# Patient Record
Sex: FEMALE | Race: WHITE | NOT HISPANIC OR LATINO | Employment: UNEMPLOYED | URBAN - METROPOLITAN AREA
[De-identification: names, ages, dates, MRNs, and addresses within clinical notes are randomized per-mention and may not be internally consistent; named-entity substitution may affect disease eponyms.]

---

## 2017-04-02 ENCOUNTER — HOSPITAL ENCOUNTER (OUTPATIENT)
Facility: MEDICAL CENTER | Age: 22
End: 2017-04-02
Attending: FAMILY MEDICINE
Payer: COMMERCIAL

## 2017-04-02 ENCOUNTER — OFFICE VISIT (OUTPATIENT)
Dept: URGENT CARE | Facility: PHYSICIAN GROUP | Age: 22
End: 2017-04-02
Payer: COMMERCIAL

## 2017-04-02 VITALS
HEART RATE: 108 BPM | DIASTOLIC BLOOD PRESSURE: 82 MMHG | OXYGEN SATURATION: 98 % | RESPIRATION RATE: 18 BRPM | TEMPERATURE: 99.7 F | SYSTOLIC BLOOD PRESSURE: 110 MMHG

## 2017-04-02 DIAGNOSIS — N76.0 BV (BACTERIAL VAGINOSIS): ICD-10-CM

## 2017-04-02 DIAGNOSIS — B96.89 BV (BACTERIAL VAGINOSIS): ICD-10-CM

## 2017-04-02 DIAGNOSIS — N89.8 VAGINAL DISCHARGE: ICD-10-CM

## 2017-04-02 DIAGNOSIS — A54.9 GONORRHEA: ICD-10-CM

## 2017-04-02 PROCEDURE — 87480 CANDIDA DNA DIR PROBE: CPT

## 2017-04-02 PROCEDURE — 87660 TRICHOMONAS VAGIN DIR PROBE: CPT

## 2017-04-02 PROCEDURE — 87491 CHLMYD TRACH DNA AMP PROBE: CPT

## 2017-04-02 PROCEDURE — 87510 GARDNER VAG DNA DIR PROBE: CPT

## 2017-04-02 PROCEDURE — 99214 OFFICE O/P EST MOD 30 MIN: CPT | Performed by: FAMILY MEDICINE

## 2017-04-02 PROCEDURE — 87591 N.GONORRHOEAE DNA AMP PROB: CPT

## 2017-04-02 ASSESSMENT — ENCOUNTER SYMPTOMS
EYE DISCHARGE: 0
SENSORY CHANGE: 0
FOCAL WEAKNESS: 0
WEIGHT LOSS: 0

## 2017-04-02 NOTE — MR AVS SNAPSHOT
Lexie Victor Manuel   2017 4:45 PM   Office Visit   MRN: [id number]    Department:  [facility]   Dept Phone:  [phone]    Description:  Female : 1995   Provider:  Darion Gilliland M.D.           Reason for Visit     Vaginal Discharge Vag discharge/ foul odor x1mos      Allergies as of 2017     Allergen Noted Reactions    Amoxicillin 2010       Causes Henoch Purpura. Unknown allergic reaction    Ibuprofen 2010       Causes Henoch Purpura. Takes Advil without problems.      You were diagnosed with     Vaginal discharge   [2015]         Vital Signs     Blood Pressure Pulse Temperature Respirations Oxygen Saturation Smoking Status    110/82 mmHg 108 37.6 °C (99.7 °F) 18 98% Former Smoker      Basic Information     Date Of Birth Sex Race Ethnicity Preferred Language    1995 Female White Non- English      Problem List              ICD-10-CM Priority Class Noted - Resolved    Teen pregnancy [id number]   2010 - Present    Twin pregnancy - Mono/Diamniotic    6/15/2010 - Present    Elevated AFP - Referred to PANN R77.2   2010 - Present    Twin pregnancy with fetal loss and retention of one fetus, antepartum O30.009, O31.10X0   2010 - Present    Postpartum care and examination    10/15/2010 - Present    Labor and delivery, indication for care O75.9   4/15/2013 - Present    Pelvic pain    2014 - Present      Health Maintenance        Date Due Completion Dates    IMM HEP B VACCINE (1 of 3 - Primary Series) 1995 ---    IMM HEP A VACCINE (1 of 2 - Standard Series) 1/10/1995 ---    IMM HPV VACCINE (1 of 3 - Female 3 Dose Series) 1/10/2005 ---    IMM VARICELLA (CHICKENPOX) VACCINE (1 of 2 - 2 Dose Adolescent Series) 1/10/2007 ---    PAP SMEAR 1/10/2015 2010    IMM DTaP/Tdap/Td Vaccine (2 - Td) 2023            Current Immunizations     MMR Vaccine 2013  6:00 PM    Tdap Vaccine 2013  4:15 PM      Below and/or attached are the  medications your provider expects you to take. Review all of your home medications and newly ordered medications with your provider and/or pharmacist. Follow medication instructions as directed by your provider and/or pharmacist. Please keep your medication list with you and share with your provider. Update the information when medications are discontinued, doses are changed, or new medications (including over-the-counter products) are added; and carry medication information at all times in the event of emergency situations     Allergies:  AMOXICILLIN - (reactions not documented)     IBUPROFEN - (reactions not documented)               Medications  Valid as of: April 02, 2017 -  5:43 PM    Generic Name Brand Name Tablet Size Instructions for use    .                 Medicines prescribed today were sent to:     [facility] #[id number] - [place], [place] - [facility] AT [address] [address]    Phone: [phone] Fax: [phone]    Open 24 Hours?: No      Medication refill instructions:       If your prescription bottle indicates you have medication refills left, it is not necessary to call your provider’s office. Please contact your pharmacy and they will refill your medication.    If your prescription bottle indicates you do not have any refills left, you may request refills at any time through one of the following ways: The online Orad system (except Urgent Care), by calling your provider’s office, or by asking your pharmacy to contact your provider’s office with a refill request. Medication refills are processed only during regular business hours and may not be available until the next business day. Your provider may request additional information or to have a follow-up visit with you prior to refilling your medication.   *Please Note: Medication refills are assigned a new Rx number when refilled electronically. Your pharmacy may indicate that no refills were authorized even though a new  prescription for the same medication is available at the pharmacy. Please request the medicine by name with the pharmacy before contacting your provider for a refill.        Your To Do List     Future Labs/Procedures Complete By Expires    CHLAMYDIA/GC PCR URINE OR SWAB  As directed 4/2/2018    VAGINAL PATHOGENS DNA PANEL  As directed 4/2/2018         CircuitHub Access Code: [email or web address]  Expires: 5/2/2017  5:43 PM    CircuitHub  A secure, online tool to manage your health information     [employer]’s CircuitHub® is a secure, online tool that connects you to your personalized health information from the privacy of your home -- day or night - making it very easy for you to manage your healthcare. Once the activation process is completed, you can even access your medical information using the CircuitHub calvin, which is available for free in the Apple Calvin store or Google Play store.     CircuitHub provides the following levels of access (as shown below):   My Chart Features   [facility] Primary Care Doctor [facility]  Specialists [facility]  Urgent  Care Non-[facility]  Primary Care  Doctor   Email your healthcare team securely and privately 24/7 X X X    Manage appointments: schedule your next appointment; view details of past/upcoming appointments X      Request prescription refills. X      View recent personal medical records, including lab and immunizations X X X X   View health record, including health history, allergies, medications X X X X   Read reports about your outpatient visits, procedures, consult and ER notes X X X X   See your discharge summary, which is a recap of your hospital and/or ER visit that includes your diagnosis, lab results, and care plan. X X       How to register for CircuitHub:  1. Go to  [email or web address].  2. Click on the Sign Up Now box, which takes you to the New Member Sign Up page. You will need to provide the following information:  a. Enter your CircuitHub Access Code exactly as it appears at the top of  this page. (You will not need to use this code after you’ve completed the sign-up process. If you do not sign up before the expiration date, you must request a new code.)   b. Enter your date of birth.   c. Enter your home email address.   d. Click Submit, and follow the next screen’s instructions.  3. Create a Bloxr ID. This will be your Bloxr login ID and cannot be changed, so think of one that is secure and easy to remember.  4. Create a Bloxr password. You can change your password at any time.  5. Enter your Password Reset Question and Answer. This can be used at a later time if you forget your password.   6. Enter your e-mail address. This allows you to receive e-mail notifications when new information is available in Bloxr.  7. Click Sign Up. You can now view your health information.    For assistance activating your Bloxr account, call [phone]

## 2017-04-03 DIAGNOSIS — N89.8 VAGINAL DISCHARGE: ICD-10-CM

## 2017-04-03 LAB
CANDIDA DNA VAG QL PROBE+SIG AMP: NEGATIVE
G VAGINALIS DNA VAG QL PROBE+SIG AMP: POSITIVE
T VAGINALIS DNA VAG QL PROBE+SIG AMP: NEGATIVE

## 2017-04-03 NOTE — PROGRESS NOTES
Subjective:      Lexie Rush is a 22 y.o. female who presents with Vaginal Discharge            Other  This is a new problem. Episode onset: 1 month vaginal discharge with change in odor. Concerned and STI and has had unproteded sex. No fever. No dysuria. No urinary urgency or frequency. Denies possible pregnancy.  Pertinent negatives include no rash.   No other aggravating or alleviating factors.      Review of Systems   Constitutional: Negative for weight loss and malaise/fatigue.   Eyes: Negative for discharge.   Skin: Negative for itching and rash.   Neurological: Negative for sensory change and focal weakness.     .  Medications, Allergies, and current problem list reviewed today in Epic       Objective:     /82 mmHg  Pulse 108  Temp(Src) 37.6 °C (99.7 °F)  Resp 18  SpO2 98%     Physical Exam   Constitutional: She appears well-developed and well-nourished. No distress.   Eyes: Conjunctivae are normal.   Genitourinary:   Yellow discharge, vaginal mucosa mildly inflamed without lesions. No CMT   Neurological:   Speech is clear. Patient is appropriate and cooperative.     Skin: Skin is warm and dry. No rash noted.               Assessment/Plan:     Cell: [phone] ok to leave message    1. Vaginal discharge  CHLAMYDIA/GC PCR URINE OR SWAB    VAGINAL PATHOGENS DNA PANEL   2. BV (bacterial vaginosis)  metronidazole (FLAGYL) 500 MG Tab   3. Gonorrhea  azithromycin (ZITHROMAX) 500 MG tablet     Differential diagnosis, natural history, supportive care, and indications for immediate follow-up discussed at length.

## 2017-04-05 LAB
C TRACH DNA SPEC QL NAA+PROBE: NEGATIVE
N GONORRHOEA DNA SPEC QL NAA+PROBE: NEGATIVE
SPECIMEN SOURCE: NORMAL

## 2017-04-05 RX ORDER — METRONIDAZOLE 500 MG/1
500 TABLET ORAL EVERY 12 HOURS
Qty: 14 TAB | Refills: 0 | Status: SHIPPED | OUTPATIENT
Start: 2017-04-05 | End: 2017-04-12

## 2017-04-06 RX ORDER — AZITHROMYCIN 500 MG/1
2000 TABLET, FILM COATED ORAL ONCE
Qty: 4 TAB | Refills: 0 | Status: SHIPPED | OUTPATIENT
Start: 2017-04-06 | End: 2017-04-06

## 2017-04-07 NOTE — PROGRESS NOTES
Gonorrhea +    I have left multiple message with number in Epic. Rx azithromycin sent to her pharmacy.

## 2017-04-08 RX ORDER — CEFTRIAXONE SODIUM 250 MG/1
250 INJECTION, POWDER, FOR SOLUTION INTRAMUSCULAR; INTRAVENOUS ONCE
OUTPATIENT
Start: 2017-04-08 | End: 2017-04-09

## 2017-09-27 ENCOUNTER — OFFICE VISIT (OUTPATIENT)
Dept: URGENT CARE | Facility: PHYSICIAN GROUP | Age: 22
End: 2017-09-27
Payer: COMMERCIAL

## 2017-09-27 VITALS
DIASTOLIC BLOOD PRESSURE: 54 MMHG | HEART RATE: 104 BPM | HEIGHT: 68 IN | BODY MASS INDEX: 21.98 KG/M2 | TEMPERATURE: 98 F | OXYGEN SATURATION: 96 % | WEIGHT: 145 LBS | SYSTOLIC BLOOD PRESSURE: 100 MMHG

## 2017-09-27 DIAGNOSIS — N89.8 VAGINAL DISCHARGE: ICD-10-CM

## 2017-09-27 PROCEDURE — 99213 OFFICE O/P EST LOW 20 MIN: CPT | Performed by: PHYSICIAN ASSISTANT

## 2017-09-27 ASSESSMENT — ENCOUNTER SYMPTOMS
ABDOMINAL PAIN: 0
FLANK PAIN: 0
VOMITING: 0
BLOOD IN STOOL: 0
CHILLS: 0
DIARRHEA: 0
FEVER: 0
NAUSEA: 0
CONSTIPATION: 0

## 2017-09-27 NOTE — PROGRESS NOTES
"Subjective:      Lexie Rush is a 22 y.o. female who presents with Exposure to STD (Dx and Tx in April for STD, symptoms have returned )            Vaginal discharge x weeks.  Evaluated in April with similar symptoms, treated for BV, patient is requesting the same medication again.   At that time she thought she her Gonorrhea test was positive.  She denies unprotected intercourse since that time.  States the symptoms had improved but her vaginal discharge recently changed and is similar to the previous episode with foul odor and slight change in color.        Exposure to STD   Pertinent negatives include no abdominal pain, chills, fever, nausea or vomiting.       Review of Systems   Constitutional: Negative for chills, fever and malaise/fatigue.   Gastrointestinal: Negative for abdominal pain, blood in stool, constipation, diarrhea, melena, nausea and vomiting.   Genitourinary: Negative.  Negative for dysuria, flank pain, frequency, hematuria and urgency.          Objective:     /54   Pulse (!) 104   Temp 36.7 °C (98 °F)   Ht 1.727 m (5' 8\")   Wt 65.8 kg (145 lb)   SpO2 96%   BMI 22.05 kg/m²      Physical Exam   Constitutional: She is oriented to person, place, and time. She appears well-developed and well-nourished.   Cardiovascular: Normal rate, regular rhythm, normal heart sounds and intact distal pulses.    Pulmonary/Chest: Effort normal and breath sounds normal.   Abdominal: Soft. Bowel sounds are normal. She exhibits no distension and no mass. There is no tenderness. There is no rebound and no guarding.   Neurological: She is alert and oriented to person, place, and time.   Skin: Skin is warm and dry. No rash noted. No pallor.   Psychiatric:   Tearful, slow to respond to questions.     Nursing note and vitals reviewed.              Assessment/Plan:     1. Vaginal discharge  While MA was setting up for pelvic exam patient became very emotional and requested to have the tests completed by her " OB/GYN.    I returned to the room and let the patient know I was not able to treat her without an exam/testing as I cannot determine the proper medication otherwise.  She would prefer to see her OB/GYN.  We discussed that she is always welcome to return if she changes her mind and would like to proceed with further evaluation.  Follow-up if symptoms change, get worse or new symptoms develop.

## 2018-03-25 ENCOUNTER — HOSPITAL ENCOUNTER (OUTPATIENT)
Facility: MEDICAL CENTER | Age: 23
End: 2018-03-25
Attending: PHYSICIAN ASSISTANT
Payer: COMMERCIAL

## 2018-03-25 ENCOUNTER — OFFICE VISIT (OUTPATIENT)
Dept: URGENT CARE | Facility: PHYSICIAN GROUP | Age: 23
End: 2018-03-25
Payer: COMMERCIAL

## 2018-03-25 VITALS
SYSTOLIC BLOOD PRESSURE: 130 MMHG | OXYGEN SATURATION: 94 % | DIASTOLIC BLOOD PRESSURE: 90 MMHG | HEART RATE: 120 BPM | TEMPERATURE: 98.1 F | HEIGHT: 67 IN | BODY MASS INDEX: 20.4 KG/M2 | WEIGHT: 130 LBS

## 2018-03-25 DIAGNOSIS — N89.8 VAGINAL DISCHARGE: ICD-10-CM

## 2018-03-25 DIAGNOSIS — Z20.2 POSSIBLE EXPOSURE TO STD: ICD-10-CM

## 2018-03-25 DIAGNOSIS — R31.9 HEMATURIA, UNSPECIFIED TYPE: ICD-10-CM

## 2018-03-25 LAB
APPEARANCE UR: ABNORMAL
BILIRUB UR STRIP-MCNC: NEGATIVE MG/DL
COLOR UR AUTO: ABNORMAL
GLUCOSE UR STRIP.AUTO-MCNC: NEGATIVE MG/DL
INT CON NEG: NORMAL
INT CON POS: NORMAL
KETONES UR STRIP.AUTO-MCNC: NEGATIVE MG/DL
LEUKOCYTE ESTERASE UR QL STRIP.AUTO: ABNORMAL
NITRITE UR QL STRIP.AUTO: NEGATIVE
PH UR STRIP.AUTO: 6 [PH] (ref 5–8)
POC URINE PREGNANCY TEST: NEGATIVE
PROT UR QL STRIP: NEGATIVE MG/DL
RBC UR QL AUTO: ABNORMAL
SP GR UR STRIP.AUTO: 1.03
UROBILINOGEN UR STRIP-MCNC: NEGATIVE MG/DL

## 2018-03-25 PROCEDURE — 99214 OFFICE O/P EST MOD 30 MIN: CPT | Performed by: PHYSICIAN ASSISTANT

## 2018-03-25 PROCEDURE — 87660 TRICHOMONAS VAGIN DIR PROBE: CPT

## 2018-03-25 PROCEDURE — 81025 URINE PREGNANCY TEST: CPT | Performed by: PHYSICIAN ASSISTANT

## 2018-03-25 PROCEDURE — 81002 URINALYSIS NONAUTO W/O SCOPE: CPT | Performed by: PHYSICIAN ASSISTANT

## 2018-03-25 PROCEDURE — 87491 CHLMYD TRACH DNA AMP PROBE: CPT

## 2018-03-25 PROCEDURE — 87510 GARDNER VAG DNA DIR PROBE: CPT

## 2018-03-25 PROCEDURE — 87480 CANDIDA DNA DIR PROBE: CPT

## 2018-03-25 PROCEDURE — 87086 URINE CULTURE/COLONY COUNT: CPT

## 2018-03-25 PROCEDURE — 87591 N.GONORRHOEAE DNA AMP PROB: CPT

## 2018-03-25 RX ORDER — CEFTRIAXONE SODIUM 250 MG/1
250 INJECTION, POWDER, FOR SOLUTION INTRAMUSCULAR; INTRAVENOUS ONCE
Status: COMPLETED | OUTPATIENT
Start: 2018-03-25 | End: 2018-03-25

## 2018-03-25 RX ORDER — AZITHROMYCIN 500 MG/1
1000 TABLET, FILM COATED ORAL DAILY
Qty: 2 TAB | Refills: 0 | Status: SHIPPED | OUTPATIENT
Start: 2018-03-25 | End: 2018-03-26

## 2018-03-25 RX ORDER — CEFTRIAXONE 1 G/1
1 INJECTION, POWDER, FOR SOLUTION INTRAMUSCULAR; INTRAVENOUS ONCE
Status: DISCONTINUED | OUTPATIENT
Start: 2018-03-25 | End: 2018-03-25

## 2018-03-25 RX ADMIN — CEFTRIAXONE SODIUM 250 MG: 250 INJECTION, POWDER, FOR SOLUTION INTRAMUSCULAR; INTRAVENOUS at 17:20

## 2018-03-25 NOTE — PROGRESS NOTES
Subjective:      Lexie Rush is a 23 y.o. female who presents with Vaginal Discharge            HPI  Chief Complaint   Patient presents with   • Vaginal Discharge       HPI:  Lexie Rush is a 23 y.o. Female  who presents with 1 year of vaginal discharge.  Has been treated for BV in past.  Hx of anxiety and social anxiety.  Having to wear tampons due to discharge.  Feels raw if she does not block the discharge.  Discharge is cloudy.  Not fishy odor.  Does not smell good.  Did have positive gonorrhea in 2016 and treated.  Ex advised her to get checked.  No dysuria.  Slight increase urgency.  Has been increasing fluids.  Some discomfort and bleeding with tampon insertion.  Has follow-up for est care visit with mother's PCP in the next several weeks, would like to get blood hiv and rpr and pap at that time.       Patient denies HA, SOB, chest pain, palpitations, fever, chills, or n/v/d.    L1.  LMP: unknown, patient irregular and with hx of endometriosis. Somewhat of a difficult historian.    Past Medical History:   Diagnosis Date   • Henoch-Schonlein purpura (CMS-HCC)     DX in at a younge age.    • Miscarriage     twins 25 weeks   • Supervision of normal first pregnancy 2010   • Teen pregnancy 2010       Past Surgical History:   Procedure Laterality Date   • PELVISCOPY  2014    Performed by Brendon Grove M.D. at [facility]       Family History   Problem Relation Age of Onset   • Hypertension Mother    • Hypertension Paternal Aunt    • Cancer       No pertinent family history.    Social History     Social History   • Marital status: Single     Spouse name: N/A   • Number of children: N/A   • Years of education: N/A     Occupational History   • Not on file.     Social History Main Topics   • Smoking status: Former Smoker     Types: Cigarettes   • Smokeless tobacco: Not on file      Comment: smoke e cigarettes   • Alcohol use No   • Drug use: No   • Sexual activity: Yes    Partners: Male     Other Topics Concern   • Not on file     Social History Narrative   • No narrative on file       No current outpatient prescriptions on file.    Allergies   Allergen Reactions   • Amoxicillin      Causes Henoch Purpura. Unknown allergic reaction   • Ibuprofen      Causes Henoch Purpura. Takes Advil without problems.         Review of Systems   Genitourinary: Negative for dysuria, frequency and urgency.        Vaginal discharge   All other systems reviewed and are negative.         Objective:     There were no vitals taken for this visit.     Physical Exam   Genitourinary:              Constitutional:   Appropriately groomed, pleasant affect, well nourished, in NAD.    Head:   Normocephalic, atraumatic.    Eyes:   EOM's full, sclera white, conjunctiva not erythematous, and medial canthus without exudate bilaterally.    Throat:  Dentition wnl, mucosa moist without lesions.      Lungs:  Respiratory effort not labored without accessory muscle use.      Abdominal:  Abdomen soft to palpation with mild suprapubic ttp.  No masses or hepatosplenomegaly.  No CVA tenderness bilaterally to percussion.     Speculum pelvic exam demonstrates a mild amount of yellow discharge at the fornix of the vaginal vault and a pink multiparous cervix. CMT noted with a friable and irritated cervix.  Mild tenderness to palpation over the left suprapubic region.      Musculoskeletal:  Gait nonantalgic with a narrow base.    Derm:  Skin without rashes or lesions with good turgor pressure.      Psychiatric:  Mood, affect, and judgement appropriate.      Assessment/Plan:     1. Vaginal discharge  POCT Urinalysis    POCT PREGNANCY    cefTRIAXone (ROCEPHIN) injection 250 mg    URINE CULTURE(NEW)    azithromycin (ZITHROMAX) 500 MG tablet    VAGINAL PATHOGENS DNA PANEL    CHLAMYDIA/GC PCR URINE OR SWAB    DISCONTINUED: cefTRIAXone (ROCEPHIN) injection 1 g   2. Possible exposure to STD  cefTRIAXone (ROCEPHIN) injection 250 mg    URINE  CULTURE(NEW)    azithromycin (ZITHROMAX) 500 MG tablet    VAGINAL PATHOGENS DNA PANEL    CHLAMYDIA/GC PCR URINE OR SWAB    DISCONTINUED: cefTRIAXone (ROCEPHIN) injection 1 g   3. Hematuria, unspecified type  URINE CULTURE(NEW)    azithromycin (ZITHROMAX) 500 MG tablet    VAGINAL PATHOGENS DNA PANEL    CHLAMYDIA/GC PCR URINE OR SWAB      Patient presents with 1 year of worsening vaginal discharge with a friable and irriated cervix on exam.  CMT noted.  Discussed tx today for gonorrhea and chlamydia and patient agreed.  Vag path obtained and will tx based on results.  Urine culture also ordered as UA showed nitrites and leukocytes and blood.  Patient asymptomatic at this time for UTI.  Did advised patient to f/up with PCP for pap smear.  Patient did request xanax 5 pills and I deferred as I advised UC is not appropriate for initiating anxiety treatment as there is not consistent follow-up.    Patient was in agreement with this treatment plan and seemed to understand without barriers. All questions were encouraged and answered.  Reviewed signs and symptoms of when to seek emergency medical care.     Please note that this dictation was created using voice recognition software.  I have made every reasonable attempt to correct obvious errors, but I expect there are errors of xochitl and possibly content that I did not discover before finalizing the note.

## 2018-03-26 DIAGNOSIS — Z20.2 POSSIBLE EXPOSURE TO STD: ICD-10-CM

## 2018-03-26 DIAGNOSIS — R31.9 HEMATURIA, UNSPECIFIED TYPE: ICD-10-CM

## 2018-03-26 DIAGNOSIS — N89.8 VAGINAL DISCHARGE: ICD-10-CM

## 2018-03-26 LAB
CANDIDA DNA VAG QL PROBE+SIG AMP: NEGATIVE
G VAGINALIS DNA VAG QL PROBE+SIG AMP: NEGATIVE
T VAGINALIS DNA VAG QL PROBE+SIG AMP: POSITIVE

## 2018-03-27 ENCOUNTER — TELEPHONE (OUTPATIENT)
Dept: URGENT CARE | Facility: PHYSICIAN GROUP | Age: 23
End: 2018-03-27

## 2018-03-27 LAB
C TRACH DNA SPEC QL NAA+PROBE: NEGATIVE
N GONORRHOEA DNA SPEC QL NAA+PROBE: POSITIVE
SPECIMEN SOURCE: ABNORMAL

## 2018-03-28 LAB
BACTERIA UR CULT: NORMAL
SIGNIFICANT IND 70042: NORMAL
SITE SITE: NORMAL
SOURCE SOURCE: NORMAL

## 2018-03-29 ENCOUNTER — TELEPHONE (OUTPATIENT)
Dept: URGENT CARE | Facility: PHYSICIAN GROUP | Age: 23
End: 2018-03-29

## 2018-03-29 DIAGNOSIS — A59.9 TRICHIMONIASIS: ICD-10-CM

## 2018-03-29 RX ORDER — METRONIDAZOLE 500 MG/1
500 TABLET ORAL 2 TIMES DAILY
Qty: 14 TAB | Refills: 0 | Status: SHIPPED | OUTPATIENT
Start: 2018-03-29 | End: 2018-04-05

## 2018-03-29 NOTE — TELEPHONE ENCOUNTER
Spoke to patient regarding labs.  Patient is improving but still having discharge.  Very appreciative of the call.  Advised she has both gonorrhea and trick.  Tx for gonorrhea with rocephin in clinic.  Sent flagyl in to pharmacy.  All questions encouraged and answered. Advised f/u with PCP for pap smear.

## 2019-05-23 ENCOUNTER — HOSPITAL ENCOUNTER (OUTPATIENT)
Facility: MEDICAL CENTER | Age: 24
End: 2019-05-23
Attending: PHYSICIAN ASSISTANT
Payer: COMMERCIAL

## 2019-05-23 ENCOUNTER — OFFICE VISIT (OUTPATIENT)
Dept: URGENT CARE | Facility: PHYSICIAN GROUP | Age: 24
End: 2019-05-23
Payer: COMMERCIAL

## 2019-05-23 VITALS
TEMPERATURE: 98.8 F | OXYGEN SATURATION: 97 % | DIASTOLIC BLOOD PRESSURE: 78 MMHG | HEART RATE: 132 BPM | SYSTOLIC BLOOD PRESSURE: 116 MMHG | WEIGHT: 143.4 LBS | HEIGHT: 68 IN | BODY MASS INDEX: 21.73 KG/M2

## 2019-05-23 DIAGNOSIS — Z20.2 POSSIBLE EXPOSURE TO STD: ICD-10-CM

## 2019-05-23 DIAGNOSIS — N89.8 VAGINAL DISCHARGE: ICD-10-CM

## 2019-05-23 PROCEDURE — 87510 GARDNER VAG DNA DIR PROBE: CPT

## 2019-05-23 PROCEDURE — 87480 CANDIDA DNA DIR PROBE: CPT

## 2019-05-23 PROCEDURE — 87660 TRICHOMONAS VAGIN DIR PROBE: CPT

## 2019-05-23 PROCEDURE — 99214 OFFICE O/P EST MOD 30 MIN: CPT | Performed by: PHYSICIAN ASSISTANT

## 2019-05-23 RX ORDER — AZITHROMYCIN 500 MG/1
1000 TABLET, FILM COATED ORAL ONCE
Qty: 2 TAB | Refills: 0 | Status: SHIPPED | OUTPATIENT
Start: 2019-05-23 | End: 2019-05-23

## 2019-05-23 ASSESSMENT — ENCOUNTER SYMPTOMS
FLANK PAIN: 0
CHILLS: 0
FEVER: 0

## 2019-05-24 DIAGNOSIS — Z20.2 POSSIBLE EXPOSURE TO STD: ICD-10-CM

## 2019-05-24 NOTE — PROGRESS NOTES
Subjective:   Lexie Rush is a 24 y.o. female who presents today with   Chief Complaint   Patient presents with   • Vaginal Discharge     STD check, no present pain, x2 weeks        HPI  Patient presents today for yellow vaginal discharge that began 2 weeks ago.  She has previously tested positive for STDs including Trichomonas in 2018 which she was treated for.  Patient states she has the same partner who she was with in 2018 in which she tested positive for trichomonas after intercourse with.  She denies any other symptoms at this time including painful urination, pelvic pain, fever, back pain or any other symptoms.  PMH:  has a past medical history of Henoch-Schonlein purpura (HCC); Miscarriage; Supervision of normal first pregnancy (5/11/2010); and Teen pregnancy (5/11/2010).  MEDS:   Current Outpatient Prescriptions:   •  azithromycin (ZITHROMAX) 500 MG tablet, Take 2 Tabs by mouth Once for 1 dose., Disp: 2 Tab, Rfl: 0    Current Facility-Administered Medications:   •  cefTRIAXone (ROCEPHIN) 250 mg, lidocaine (XYLOCAINE) 1 % 0.9 mL for IM use, 250 mg, Intramuscular, Once, ROSETTA RiveraA.-SIM.  ALLERGIES:   Allergies   Allergen Reactions   • Amoxicillin      Causes Henoch Purpura. Unknown allergic reaction   • Ibuprofen      Causes Henoch Purpura. Takes Advil without problems.     SURGHX:   Past Surgical History:   Procedure Laterality Date   • PELVISCOPY  4/7/2014    Performed by Brendon Grove M.D. at [facility]     SOCHX:  reports that she has been smoking Cigarettes.  She has never used smokeless tobacco. She reports that she drinks alcohol. She reports that she uses drugs, including Marijuana.  FH: Reviewed with patient, not pertinent to this visit.       Review of Systems   Constitutional: Negative for chills and fever.   Genitourinary: Negative for dysuria, flank pain, frequency, hematuria and urgency.        Vaginal discharge   All other systems reviewed and are negative.        "Objective:   /78 (BP Location: Left arm, Patient Position: Sitting, BP Cuff Size: Adult)   Pulse (!) 132   Temp 37.1 °C (98.8 °F) (Temporal)   Ht 1.727 m (5' 8\")   Wt 65 kg (143 lb 6.4 oz)   SpO2 97%   BMI 21.80 kg/m²   Physical Exam   Constitutional: She appears well-developed and well-nourished. No distress.   HENT:   Head: Normocephalic and atraumatic.   Right Ear: Hearing normal.   Left Ear: Hearing normal.   Eyes: Pupils are equal, round, and reactive to light.   Cardiovascular: Normal rate, regular rhythm and normal heart sounds.    Pulmonary/Chest: Effort normal and breath sounds normal.   Abdominal: There is no tenderness.   Genitourinary:   Genitourinary Comments: Patient deferred  exam   Musculoskeletal:   Normal movement in all 4 extremities   Neurological: She is alert. Coordination normal.   Skin: Skin is warm and dry.   Psychiatric: She has a normal mood and affect.   Nursing note and vitals reviewed.    Patient tolerated previous dose of Rocephin and tolerated her dosage of Rocephin today in clinic.  Patient elected to do a self swab today.   Assessment/Plan:   Assessment    1. Possible exposure to STD  - CHLAMYDIA/GC PCR URINE OR SWAB; Future  - VAGINAL PATHOGENS DNA PANEL; Future  - POCT Urinalysis  - cefTRIAXone (ROCEPHIN) 250 mg, lidocaine (XYLOCAINE) 1 % 0.9 mL for IM use; 250 mg by Intramuscular route Once.  - azithromycin (ZITHROMAX) 500 MG tablet; Take 2 Tabs by mouth Once for 1 dose.  Dispense: 2 Tab; Refill: 0    2. Vaginal discharge  Patient treated empirically today with antibiotics.  I will follow-up with vaginal pathogen and GC chlamydia testing.  Differential diagnosis, natural history, supportive care, and indications for immediate follow-up discussed.   Patient given instructions and understanding of medications and treatment.    If not improving in 3-5 days, F/U with PCP or return to UC if symptoms worsen.  Strict ER precautions given.  Patient agreeable to " plan.      Carlo Jung PA-C

## 2019-05-29 ENCOUNTER — TELEPHONE (OUTPATIENT)
Dept: URGENT CARE | Facility: PHYSICIAN GROUP | Age: 24
End: 2019-05-29

## 2019-05-29 DIAGNOSIS — A59.01 TRICHOMONAS VAGINALIS (TV) INFECTION: ICD-10-CM

## 2019-05-29 RX ORDER — METRONIDAZOLE 500 MG/1
500 TABLET ORAL 2 TIMES DAILY
Qty: 14 TAB | Refills: 0 | Status: SHIPPED | OUTPATIENT
Start: 2019-05-29 | End: 2019-06-05

## 2019-05-29 NOTE — TELEPHONE ENCOUNTER
----- Message from Carlo Jung P.A.-C. sent at 5/29/2019  7:39 AM PDT -----  Please notify patient of positive trichomonas testing.  I attempted to call patient with the phone number listed but it kept stating that this number is unavailable at this time.  No voicemail.  I sent in a prescription for oral metronidazole but she should take twice a day for 7 days.  Please have her contact me with any further questions.

## 2019-05-29 NOTE — LETTER
May 29, 2019        Lexie Rush  [address] [place] [address]        Dear Lexie:      We have tried contacting you several times by phone and have been unsuccessful. Please call our office for you results and instructions as soon as possible.       If you have any questions or concerns, please don't hesitate to call.        Sincerely,        Carlo Jung P.A.-C.    Electronically Signed

## 2019-05-29 NOTE — PROGRESS NOTES
Attempted to call patient regarding trichomonas labs coming back positive.  Prescription sent into pharmacy.  Patient's phone number is no longer available.  Will attempt to contact her by other methods.

## 2019-10-07 ENCOUNTER — OFFICE VISIT (OUTPATIENT)
Dept: URGENT CARE | Facility: PHYSICIAN GROUP | Age: 24
End: 2019-10-07
Payer: COMMERCIAL

## 2019-10-07 ENCOUNTER — HOSPITAL ENCOUNTER (OUTPATIENT)
Facility: MEDICAL CENTER | Age: 24
End: 2019-10-07
Attending: PHYSICIAN ASSISTANT
Payer: COMMERCIAL

## 2019-10-07 VITALS
RESPIRATION RATE: 16 BRPM | DIASTOLIC BLOOD PRESSURE: 70 MMHG | TEMPERATURE: 98.4 F | WEIGHT: 142 LBS | HEART RATE: 82 BPM | HEIGHT: 68 IN | OXYGEN SATURATION: 96 % | SYSTOLIC BLOOD PRESSURE: 118 MMHG | BODY MASS INDEX: 21.52 KG/M2

## 2019-10-07 DIAGNOSIS — N76.0 ACUTE VAGINITIS: ICD-10-CM

## 2019-10-07 LAB
APPEARANCE UR: NORMAL
BILIRUB UR STRIP-MCNC: NORMAL MG/DL
COLOR UR AUTO: YELLOW
GLUCOSE UR STRIP.AUTO-MCNC: NORMAL MG/DL
INT CON NEG: NEGATIVE
INT CON POS: POSITIVE
KETONES UR STRIP.AUTO-MCNC: NORMAL MG/DL
LEUKOCYTE ESTERASE UR QL STRIP.AUTO: NORMAL
NITRITE UR QL STRIP.AUTO: NORMAL
PH UR STRIP.AUTO: 7 [PH] (ref 5–8)
POC URINE PREGNANCY TEST: NORMAL
PROT UR QL STRIP: NORMAL MG/DL
RBC UR QL AUTO: NORMAL
SP GR UR STRIP.AUTO: 1.01
UROBILINOGEN UR STRIP-MCNC: 0.2 MG/DL

## 2019-10-07 PROCEDURE — 87510 GARDNER VAG DNA DIR PROBE: CPT

## 2019-10-07 PROCEDURE — 87480 CANDIDA DNA DIR PROBE: CPT

## 2019-10-07 PROCEDURE — 81002 URINALYSIS NONAUTO W/O SCOPE: CPT | Performed by: PHYSICIAN ASSISTANT

## 2019-10-07 PROCEDURE — 87491 CHLMYD TRACH DNA AMP PROBE: CPT

## 2019-10-07 PROCEDURE — 87660 TRICHOMONAS VAGIN DIR PROBE: CPT

## 2019-10-07 PROCEDURE — 87591 N.GONORRHOEAE DNA AMP PROB: CPT

## 2019-10-07 PROCEDURE — 81025 URINE PREGNANCY TEST: CPT | Performed by: PHYSICIAN ASSISTANT

## 2019-10-07 PROCEDURE — 99214 OFFICE O/P EST MOD 30 MIN: CPT | Performed by: PHYSICIAN ASSISTANT

## 2019-10-07 RX ORDER — METRONIDAZOLE 500 MG/1
500 TABLET ORAL 2 TIMES DAILY
Qty: 14 TAB | Refills: 0 | Status: SHIPPED | OUTPATIENT
Start: 2019-10-07 | End: 2019-10-14

## 2019-10-08 DIAGNOSIS — N76.0 ACUTE VAGINITIS: ICD-10-CM

## 2019-10-09 ASSESSMENT — ENCOUNTER SYMPTOMS
DIARRHEA: 0
FEVER: 0
ABDOMINAL PAIN: 0
FLANK PAIN: 0
MYALGIAS: 0
VOMITING: 0

## 2019-10-09 NOTE — PROGRESS NOTES
"Subjective:      Lexie Rush is a 24 y.o. female who presents with Exposure to STD (esposure to STD, pt believes she got what she had back from her boyfriend, odor)            Patient is a 24-year-old female presents to urgent care with possible STD exposure.  Patient does report a new sexual partner with recent intercourse a few days ago.  They currently do not use condoms.  She admits that she does have a slight discharge however notes that she does have a slight fishy odor.  She denies any burning with urination, vaginal itching or abdominal pain.  Patient does report irregularity to her menstrual cycle and is uncertain when her last cycle was.    Exposure to STD   This is a new problem. The current episode started in the past 7 days. The problem occurs daily. The problem has been waxing and waning. Pertinent negatives include no abdominal pain, fever, myalgias, urinary symptoms or vomiting. Nothing aggravates the symptoms. She has tried nothing for the symptoms.       Review of Systems   Constitutional: Negative for fever.   Gastrointestinal: Negative for abdominal pain, diarrhea and vomiting.   Genitourinary: Negative for dysuria, flank pain, frequency, hematuria and urgency.   Musculoskeletal: Negative for myalgias.   All other systems reviewed and are negative.         Objective:     /70 (BP Location: Right arm, Patient Position: Sitting, BP Cuff Size: Adult)   Pulse 82   Temp 36.9 °C (98.4 °F) (Temporal)   Resp 16   Ht 1.727 m (5' 8\")   Wt 64.4 kg (142 lb)   SpO2 96%   BMI 21.59 kg/m²    PMH:  has a past medical history of Henoch-Schonlein purpura (HCC), Miscarriage, Supervision of normal first pregnancy (5/11/2010), and Teen pregnancy (5/11/2010).  MEDS:   Current Outpatient Medications:   •  metroNIDAZOLE (FLAGYL) 500 MG Tab, Take 1 Tab by mouth 2 Times a Day for 7 days. Must avoid Alcohol consumption while on medication., Disp: 14 Tab, Rfl: 0  ALLERGIES:   Allergies   Allergen Reactions "   • Amoxicillin      Causes Henoch Purpura. Unknown allergic reaction   • Ibuprofen      Causes Henoch Purpura. Takes Advil without problems.     SURGHX:   Past Surgical History:   Procedure Laterality Date   • PELVISCOPY  4/7/2014    Performed by Brendon Grove M.D. at [facility]     SOCHX:  reports that she has been smoking cigarettes. She has never used smokeless tobacco. She reports that she drinks alcohol. She reports that she has current or past drug history. Drug: Marijuana.  FH: Family history was reviewed, no pertinent findings to report    Physical Exam   Constitutional: She is oriented to person, place, and time. She appears well-developed and well-nourished. No distress.   HENT:   Head: Normocephalic and atraumatic.   Eyes: Pupils are equal, round, and reactive to light. Conjunctivae and EOM are normal.   Neck: Normal range of motion. Neck supple. No tracheal deviation present.   Cardiovascular: Normal rate.   No murmur heard.  Pulmonary/Chest: Effort normal. No respiratory distress.   Genitourinary:   Genitourinary Comments: Patient deferred   Neurological: She is alert and oriented to person, place, and time. Coordination normal.   Skin: Skin is warm. No rash noted.   Psychiatric: She has a normal mood and affect. Her behavior is normal. Judgment and thought content normal.   Vitals reviewed.           hCG negative.  Urinalysis negative.  Assessment/Plan:     1. Acute vaginitis  - metroNIDAZOLE (FLAGYL) 500 MG Tab; Take 1 Tab by mouth 2 Times a Day for 7 days. Must avoid Alcohol consumption while on medication.  Dispense: 14 Tab; Refill: 0  - CHLAMYDIA/GC PCR URINE OR SWAB; Future  - VAGINAL PATHOGENS DNA PANEL; Future  - POCT Urinalysis  - POCT Pregnancy    Vaginitis currently appears more consistent with BV at this time we will start the patient on the above and will send off for further cultures.  Patient is to abstain from sexual encounters until results return.  Patient given  precautionary s/sx that mandate immediate follow up and evaluation in the ED. Advised of risks of not doing so.    DDX, Supportive care, and indications for immediate follow-up discussed with patient.    Instructed to return to clinic or nearest emergency department if we are not available for any change in condition, further concerns, or worsening of symptoms.    The patient demonstrated a good understanding and agreed with the treatment plan.  Please note that this dictation was created using voice recognition software. I have made every reasonable attempt to correct obvious errors, but I expect that there are errors of grammar and possibly content that I did not discover before finalizing the note.

## 2019-10-11 ENCOUNTER — TELEPHONE (OUTPATIENT)
Dept: URGENT CARE | Facility: PHYSICIAN GROUP | Age: 24
End: 2019-10-11

## 2019-10-11 NOTE — TELEPHONE ENCOUNTER
----- Message from Chaka Antunez P.A.-C. sent at 10/10/2019  2:16 PM PDT -----  Please alert this patient of positive results for BV- as discussed- she was negative for gonorrhea and chlamydia.  No new treatment is needed at this time.  Thanks!  Chaka

## 2019-10-19 ENCOUNTER — TELEPHONE (OUTPATIENT)
Dept: URGENT CARE | Facility: PHYSICIAN GROUP | Age: 24
End: 2019-10-19

## 2021-07-18 ENCOUNTER — HOSPITAL ENCOUNTER (INPATIENT)
Facility: MEDICAL CENTER | Age: 26
LOS: 8 days | DRG: 918 | End: 2021-07-27
Attending: EMERGENCY MEDICINE | Admitting: STUDENT IN AN ORGANIZED HEALTH CARE EDUCATION/TRAINING PROGRAM
Payer: MEDICAID

## 2021-07-18 DIAGNOSIS — T50.902A INTENTIONAL DRUG OVERDOSE, INITIAL ENCOUNTER (HCC): ICD-10-CM

## 2021-07-18 DIAGNOSIS — T39.1X2A INTENTIONAL ACETAMINOPHEN OVERDOSE, INITIAL ENCOUNTER (HCC): ICD-10-CM

## 2021-07-18 DIAGNOSIS — R45.851 SUICIDAL IDEATION: ICD-10-CM

## 2021-07-18 DIAGNOSIS — F15.10 METHAMPHETAMINE ABUSE (HCC): ICD-10-CM

## 2021-07-18 DIAGNOSIS — F11.10 HEROIN ABUSE (HCC): ICD-10-CM

## 2021-07-18 PROBLEM — F10.929 ALCOHOL INTOXICATION (HCC): Status: ACTIVE | Noted: 2021-07-18

## 2021-07-18 PROBLEM — E87.6 HYPOKALEMIA: Status: ACTIVE | Noted: 2021-07-18

## 2021-07-18 PROBLEM — F19.10 POLYSUBSTANCE ABUSE (HCC): Status: ACTIVE | Noted: 2021-07-18

## 2021-07-18 LAB
ALBUMIN SERPL BCP-MCNC: 4.8 G/DL (ref 3.2–4.9)
ALBUMIN/GLOB SERPL: 1.8 G/DL
ALP SERPL-CCNC: 66 U/L (ref 30–99)
ALT SERPL-CCNC: 15 U/L (ref 2–50)
AMPHET UR QL SCN: POSITIVE
ANION GAP SERPL CALC-SCNC: 21 MMOL/L (ref 7–16)
APAP SERPL-MCNC: 313 UG/ML (ref 10–30)
APAP SERPL-MCNC: 328 UG/ML (ref 10–30)
APTT PPP: 26.7 SEC (ref 24.7–36)
AST SERPL-CCNC: 30 U/L (ref 12–45)
BARBITURATES UR QL SCN: NEGATIVE
BASOPHILS # BLD AUTO: 0.5 % (ref 0–1.8)
BASOPHILS # BLD: 0.04 K/UL (ref 0–0.12)
BENZODIAZ UR QL SCN: NEGATIVE
BILIRUB SERPL-MCNC: 0.2 MG/DL (ref 0.1–1.5)
BUN SERPL-MCNC: 9 MG/DL (ref 8–22)
BZE UR QL SCN: NEGATIVE
CALCIUM SERPL-MCNC: 9.4 MG/DL (ref 8.5–10.5)
CANNABINOIDS UR QL SCN: NEGATIVE
CHLORIDE SERPL-SCNC: 101 MMOL/L (ref 96–112)
CO2 SERPL-SCNC: 19 MMOL/L (ref 20–33)
CREAT SERPL-MCNC: 0.68 MG/DL (ref 0.5–1.4)
EKG IMPRESSION: NORMAL
EOSINOPHIL # BLD AUTO: 0.31 K/UL (ref 0–0.51)
EOSINOPHIL NFR BLD: 3.7 % (ref 0–6.9)
ERYTHROCYTE [DISTWIDTH] IN BLOOD BY AUTOMATED COUNT: 43.8 FL (ref 35.9–50)
ETHANOL BLD-MCNC: 183.8 MG/DL (ref 0–10)
GLOBULIN SER CALC-MCNC: 2.6 G/DL (ref 1.9–3.5)
GLUCOSE SERPL-MCNC: 92 MG/DL (ref 65–99)
HCG SERPL QL: NEGATIVE
HCT VFR BLD AUTO: 42.8 % (ref 37–47)
HGB BLD-MCNC: 14.4 G/DL (ref 12–16)
IMM GRANULOCYTES # BLD AUTO: 0.02 K/UL (ref 0–0.11)
IMM GRANULOCYTES NFR BLD AUTO: 0.2 % (ref 0–0.9)
INR PPP: 0.92 (ref 0.87–1.13)
LIPASE SERPL-CCNC: 31 U/L (ref 11–82)
LYMPHOCYTES # BLD AUTO: 3.8 K/UL (ref 1–4.8)
LYMPHOCYTES NFR BLD: 45.9 % (ref 22–41)
MCH RBC QN AUTO: 30.6 PG (ref 27–33)
MCHC RBC AUTO-ENTMCNC: 33.6 G/DL (ref 33.6–35)
MCV RBC AUTO: 90.9 FL (ref 81.4–97.8)
METHADONE UR QL SCN: NEGATIVE
MONOCYTES # BLD AUTO: 0.32 K/UL (ref 0–0.85)
MONOCYTES NFR BLD AUTO: 3.9 % (ref 0–13.4)
NEUTROPHILS # BLD AUTO: 3.79 K/UL (ref 2–7.15)
NEUTROPHILS NFR BLD: 45.8 % (ref 44–72)
NRBC # BLD AUTO: 0 K/UL
NRBC BLD-RTO: 0 /100 WBC
OPIATES UR QL SCN: POSITIVE
OXYCODONE UR QL SCN: NEGATIVE
PCP UR QL SCN: NEGATIVE
PLATELET # BLD AUTO: 298 K/UL (ref 164–446)
PMV BLD AUTO: 9.7 FL (ref 9–12.9)
POC BREATHALIZER: 0.16 PERCENT (ref 0–0.01)
POTASSIUM SERPL-SCNC: 3.1 MMOL/L (ref 3.6–5.5)
PROPOXYPH UR QL SCN: NEGATIVE
PROT SERPL-MCNC: 7.4 G/DL (ref 6–8.2)
PROTHROMBIN TIME: 12.1 SEC (ref 12–14.6)
RBC # BLD AUTO: 4.71 M/UL (ref 4.2–5.4)
SALICYLATES SERPL-MCNC: <1 MG/DL (ref 15–25)
SODIUM SERPL-SCNC: 141 MMOL/L (ref 135–145)
WBC # BLD AUTO: 8.3 K/UL (ref 4.8–10.8)

## 2021-07-18 PROCEDURE — 700105 HCHG RX REV CODE 258: Performed by: EMERGENCY MEDICINE

## 2021-07-18 PROCEDURE — G0378 HOSPITAL OBSERVATION PER HR: HCPCS

## 2021-07-18 PROCEDURE — 80179 DRUG ASSAY SALICYLATE: CPT

## 2021-07-18 PROCEDURE — 80307 DRUG TEST PRSMV CHEM ANLYZR: CPT

## 2021-07-18 PROCEDURE — A9270 NON-COVERED ITEM OR SERVICE: HCPCS | Performed by: INTERNAL MEDICINE

## 2021-07-18 PROCEDURE — HZ2ZZZZ DETOXIFICATION SERVICES FOR SUBSTANCE ABUSE TREATMENT: ICD-10-PCS | Performed by: INTERNAL MEDICINE

## 2021-07-18 PROCEDURE — 700101 HCHG RX REV CODE 250: Performed by: INTERNAL MEDICINE

## 2021-07-18 PROCEDURE — 80143 DRUG ASSAY ACETAMINOPHEN: CPT

## 2021-07-18 PROCEDURE — 85730 THROMBOPLASTIN TIME PARTIAL: CPT

## 2021-07-18 PROCEDURE — 302970 POC BREATHALIZER: Performed by: EMERGENCY MEDICINE

## 2021-07-18 PROCEDURE — 85025 COMPLETE CBC W/AUTO DIFF WBC: CPT

## 2021-07-18 PROCEDURE — 80053 COMPREHEN METABOLIC PANEL: CPT

## 2021-07-18 PROCEDURE — 99220 PR INITIAL OBSERVATION CARE,LEVL III: CPT | Performed by: INTERNAL MEDICINE

## 2021-07-18 PROCEDURE — 99285 EMERGENCY DEPT VISIT HI MDM: CPT

## 2021-07-18 PROCEDURE — 302970 POC BREATHALIZER

## 2021-07-18 PROCEDURE — 83690 ASSAY OF LIPASE: CPT

## 2021-07-18 PROCEDURE — 84703 CHORIONIC GONADOTROPIN ASSAY: CPT

## 2021-07-18 PROCEDURE — 96368 THER/DIAG CONCURRENT INF: CPT

## 2021-07-18 PROCEDURE — 700102 HCHG RX REV CODE 250 W/ 637 OVERRIDE(OP): Performed by: INTERNAL MEDICINE

## 2021-07-18 PROCEDURE — 700101 HCHG RX REV CODE 250: Performed by: EMERGENCY MEDICINE

## 2021-07-18 PROCEDURE — 85610 PROTHROMBIN TIME: CPT

## 2021-07-18 PROCEDURE — 96365 THER/PROPH/DIAG IV INF INIT: CPT

## 2021-07-18 PROCEDURE — 96366 THER/PROPH/DIAG IV INF ADDON: CPT

## 2021-07-18 PROCEDURE — 93005 ELECTROCARDIOGRAM TRACING: CPT | Performed by: EMERGENCY MEDICINE

## 2021-07-18 PROCEDURE — 82077 ASSAY SPEC XCP UR&BREATH IA: CPT

## 2021-07-18 RX ORDER — PROMETHAZINE HYDROCHLORIDE 25 MG/1
12.5-25 TABLET ORAL EVERY 4 HOURS PRN
Status: DISCONTINUED | OUTPATIENT
Start: 2021-07-18 | End: 2021-07-27 | Stop reason: HOSPADM

## 2021-07-18 RX ORDER — FAMOTIDINE 20 MG/1
20 TABLET, FILM COATED ORAL 2 TIMES DAILY
Status: DISCONTINUED | OUTPATIENT
Start: 2021-07-18 | End: 2021-07-27 | Stop reason: HOSPADM

## 2021-07-18 RX ORDER — PROMETHAZINE HYDROCHLORIDE 25 MG/1
12.5-25 SUPPOSITORY RECTAL EVERY 4 HOURS PRN
Status: DISCONTINUED | OUTPATIENT
Start: 2021-07-18 | End: 2021-07-27 | Stop reason: HOSPADM

## 2021-07-18 RX ORDER — ENALAPRILAT 1.25 MG/ML
1.25 INJECTION INTRAVENOUS EVERY 6 HOURS PRN
Status: DISCONTINUED | OUTPATIENT
Start: 2021-07-18 | End: 2021-07-27 | Stop reason: HOSPADM

## 2021-07-18 RX ORDER — POTASSIUM CHLORIDE 20 MEQ/1
40 TABLET, EXTENDED RELEASE ORAL ONCE
Status: COMPLETED | OUTPATIENT
Start: 2021-07-18 | End: 2021-07-18

## 2021-07-18 RX ORDER — PROCHLORPERAZINE EDISYLATE 5 MG/ML
5-10 INJECTION INTRAMUSCULAR; INTRAVENOUS EVERY 4 HOURS PRN
Status: DISCONTINUED | OUTPATIENT
Start: 2021-07-18 | End: 2021-07-27 | Stop reason: HOSPADM

## 2021-07-18 RX ORDER — LABETALOL HYDROCHLORIDE 5 MG/ML
10 INJECTION, SOLUTION INTRAVENOUS EVERY 4 HOURS PRN
Status: DISCONTINUED | OUTPATIENT
Start: 2021-07-18 | End: 2021-07-27 | Stop reason: HOSPADM

## 2021-07-18 RX ORDER — ONDANSETRON 2 MG/ML
4 INJECTION INTRAMUSCULAR; INTRAVENOUS EVERY 4 HOURS PRN
Status: DISCONTINUED | OUTPATIENT
Start: 2021-07-18 | End: 2021-07-27 | Stop reason: HOSPADM

## 2021-07-18 RX ORDER — POLYETHYLENE GLYCOL 3350 17 G/17G
1 POWDER, FOR SOLUTION ORAL
Status: DISCONTINUED | OUTPATIENT
Start: 2021-07-18 | End: 2021-07-27 | Stop reason: HOSPADM

## 2021-07-18 RX ORDER — HALOPERIDOL 5 MG/ML
2-5 INJECTION INTRAMUSCULAR EVERY 4 HOURS PRN
Status: DISCONTINUED | OUTPATIENT
Start: 2021-07-18 | End: 2021-07-27 | Stop reason: HOSPADM

## 2021-07-18 RX ORDER — BISACODYL 10 MG
10 SUPPOSITORY, RECTAL RECTAL
Status: DISCONTINUED | OUTPATIENT
Start: 2021-07-18 | End: 2021-07-27 | Stop reason: HOSPADM

## 2021-07-18 RX ORDER — AMOXICILLIN 250 MG
2 CAPSULE ORAL 2 TIMES DAILY
Status: DISCONTINUED | OUTPATIENT
Start: 2021-07-18 | End: 2021-07-27 | Stop reason: HOSPADM

## 2021-07-18 RX ORDER — CLONIDINE HYDROCHLORIDE 0.1 MG/1
0.1 TABLET ORAL EVERY 6 HOURS PRN
Status: DISCONTINUED | OUTPATIENT
Start: 2021-07-18 | End: 2021-07-27 | Stop reason: HOSPADM

## 2021-07-18 RX ORDER — SODIUM CHLORIDE AND POTASSIUM CHLORIDE 300; 900 MG/100ML; MG/100ML
INJECTION, SOLUTION INTRAVENOUS CONTINUOUS
Status: DISCONTINUED | OUTPATIENT
Start: 2021-07-18 | End: 2021-07-22

## 2021-07-18 RX ADMIN — POTASSIUM CHLORIDE 40 MEQ: 1500 TABLET, EXTENDED RELEASE ORAL at 22:24

## 2021-07-18 RX ADMIN — ACETYLCYSTEINE 9520 MG: 200 SOLUTION ORAL; RESPIRATORY (INHALATION) at 20:33

## 2021-07-18 RX ADMIN — POTASSIUM CHLORIDE AND SODIUM CHLORIDE: 900; 300 INJECTION, SOLUTION INTRAVENOUS at 20:45

## 2021-07-18 RX ADMIN — ACETYLCYSTEINE 3180 MG: 200 SOLUTION ORAL; RESPIRATORY (INHALATION) at 21:55

## 2021-07-19 PROBLEM — Z72.0 TOBACCO ABUSE: Status: ACTIVE | Noted: 2021-07-19

## 2021-07-19 LAB
ALBUMIN SERPL BCP-MCNC: 3.9 G/DL (ref 3.2–4.9)
ALBUMIN SERPL BCP-MCNC: 3.9 G/DL (ref 3.2–4.9)
ALBUMIN/GLOB SERPL: 1.6 G/DL
ALBUMIN/GLOB SERPL: 1.9 G/DL
ALP SERPL-CCNC: 45 U/L (ref 30–99)
ALP SERPL-CCNC: 46 U/L (ref 30–99)
ALT SERPL-CCNC: 13 U/L (ref 2–50)
ALT SERPL-CCNC: 13 U/L (ref 2–50)
ANION GAP SERPL CALC-SCNC: 13 MMOL/L (ref 7–16)
ANION GAP SERPL CALC-SCNC: 15 MMOL/L (ref 7–16)
APAP SERPL-MCNC: 64 UG/ML (ref 10–30)
APAP SERPL-MCNC: <5 UG/ML (ref 10–30)
AST SERPL-CCNC: 18 U/L (ref 12–45)
AST SERPL-CCNC: 23 U/L (ref 12–45)
BASOPHILS # BLD AUTO: 0.5 % (ref 0–1.8)
BASOPHILS # BLD: 0.03 K/UL (ref 0–0.12)
BILIRUB SERPL-MCNC: 0.2 MG/DL (ref 0.1–1.5)
BILIRUB SERPL-MCNC: 0.2 MG/DL (ref 0.1–1.5)
BUN SERPL-MCNC: 8 MG/DL (ref 8–22)
BUN SERPL-MCNC: 8 MG/DL (ref 8–22)
CALCIUM SERPL-MCNC: 8 MG/DL (ref 8.5–10.5)
CALCIUM SERPL-MCNC: 8.1 MG/DL (ref 8.5–10.5)
CHLORIDE SERPL-SCNC: 101 MMOL/L (ref 96–112)
CHLORIDE SERPL-SCNC: 101 MMOL/L (ref 96–112)
CO2 SERPL-SCNC: 17 MMOL/L (ref 20–33)
CO2 SERPL-SCNC: 19 MMOL/L (ref 20–33)
CREAT SERPL-MCNC: 0.57 MG/DL (ref 0.5–1.4)
CREAT SERPL-MCNC: 0.61 MG/DL (ref 0.5–1.4)
EOSINOPHIL # BLD AUTO: 0.02 K/UL (ref 0–0.51)
EOSINOPHIL NFR BLD: 0.3 % (ref 0–6.9)
ERYTHROCYTE [DISTWIDTH] IN BLOOD BY AUTOMATED COUNT: 43.5 FL (ref 35.9–50)
GLOBULIN SER CALC-MCNC: 2.1 G/DL (ref 1.9–3.5)
GLOBULIN SER CALC-MCNC: 2.4 G/DL (ref 1.9–3.5)
GLUCOSE SERPL-MCNC: 130 MG/DL (ref 65–99)
GLUCOSE SERPL-MCNC: 135 MG/DL (ref 65–99)
HCT VFR BLD AUTO: 39.5 % (ref 37–47)
HGB BLD-MCNC: 13.3 G/DL (ref 12–16)
IMM GRANULOCYTES # BLD AUTO: 0.03 K/UL (ref 0–0.11)
IMM GRANULOCYTES NFR BLD AUTO: 0.5 % (ref 0–0.9)
INR PPP: 1.22 (ref 0.87–1.13)
LYMPHOCYTES # BLD AUTO: 2.28 K/UL (ref 1–4.8)
LYMPHOCYTES NFR BLD: 36.3 % (ref 22–41)
MCH RBC QN AUTO: 30.5 PG (ref 27–33)
MCHC RBC AUTO-ENTMCNC: 33.7 G/DL (ref 33.6–35)
MCV RBC AUTO: 90.6 FL (ref 81.4–97.8)
MONOCYTES # BLD AUTO: 0.54 K/UL (ref 0–0.85)
MONOCYTES NFR BLD AUTO: 8.6 % (ref 0–13.4)
NEUTROPHILS # BLD AUTO: 3.38 K/UL (ref 2–7.15)
NEUTROPHILS NFR BLD: 53.8 % (ref 44–72)
NRBC # BLD AUTO: 0 K/UL
NRBC BLD-RTO: 0 /100 WBC
PLATELET # BLD AUTO: 272 K/UL (ref 164–446)
PMV BLD AUTO: 9.6 FL (ref 9–12.9)
POTASSIUM SERPL-SCNC: 3.2 MMOL/L (ref 3.6–5.5)
POTASSIUM SERPL-SCNC: 3.6 MMOL/L (ref 3.6–5.5)
PROT SERPL-MCNC: 6 G/DL (ref 6–8.2)
PROT SERPL-MCNC: 6.3 G/DL (ref 6–8.2)
PROTHROMBIN TIME: 15.1 SEC (ref 12–14.6)
RBC # BLD AUTO: 4.36 M/UL (ref 4.2–5.4)
SODIUM SERPL-SCNC: 133 MMOL/L (ref 135–145)
SODIUM SERPL-SCNC: 133 MMOL/L (ref 135–145)
WBC # BLD AUTO: 6.3 K/UL (ref 4.8–10.8)

## 2021-07-19 PROCEDURE — 80053 COMPREHEN METABOLIC PANEL: CPT

## 2021-07-19 PROCEDURE — 700111 HCHG RX REV CODE 636 W/ 250 OVERRIDE (IP): Performed by: INTERNAL MEDICINE

## 2021-07-19 PROCEDURE — 36415 COLL VENOUS BLD VENIPUNCTURE: CPT

## 2021-07-19 PROCEDURE — 99232 SBSQ HOSP IP/OBS MODERATE 35: CPT | Mod: 25 | Performed by: INTERNAL MEDICINE

## 2021-07-19 PROCEDURE — 700101 HCHG RX REV CODE 250: Performed by: INTERNAL MEDICINE

## 2021-07-19 PROCEDURE — 700101 HCHG RX REV CODE 250: Performed by: STUDENT IN AN ORGANIZED HEALTH CARE EDUCATION/TRAINING PROGRAM

## 2021-07-19 PROCEDURE — 770020 HCHG ROOM/CARE - TELE (206)

## 2021-07-19 PROCEDURE — 85025 COMPLETE CBC W/AUTO DIFF WBC: CPT

## 2021-07-19 PROCEDURE — 85610 PROTHROMBIN TIME: CPT

## 2021-07-19 PROCEDURE — 96375 TX/PRO/DX INJ NEW DRUG ADDON: CPT

## 2021-07-19 PROCEDURE — 96366 THER/PROPH/DIAG IV INF ADDON: CPT

## 2021-07-19 PROCEDURE — A9270 NON-COVERED ITEM OR SERVICE: HCPCS | Performed by: INTERNAL MEDICINE

## 2021-07-19 PROCEDURE — 700102 HCHG RX REV CODE 250 W/ 637 OVERRIDE(OP): Performed by: STUDENT IN AN ORGANIZED HEALTH CARE EDUCATION/TRAINING PROGRAM

## 2021-07-19 PROCEDURE — 80143 DRUG ASSAY ACETAMINOPHEN: CPT

## 2021-07-19 PROCEDURE — A9270 NON-COVERED ITEM OR SERVICE: HCPCS | Performed by: STUDENT IN AN ORGANIZED HEALTH CARE EDUCATION/TRAINING PROGRAM

## 2021-07-19 PROCEDURE — 700105 HCHG RX REV CODE 258: Performed by: INTERNAL MEDICINE

## 2021-07-19 PROCEDURE — 99406 BEHAV CHNG SMOKING 3-10 MIN: CPT | Performed by: INTERNAL MEDICINE

## 2021-07-19 PROCEDURE — 700111 HCHG RX REV CODE 636 W/ 250 OVERRIDE (IP): Performed by: STUDENT IN AN ORGANIZED HEALTH CARE EDUCATION/TRAINING PROGRAM

## 2021-07-19 PROCEDURE — 700102 HCHG RX REV CODE 250 W/ 637 OVERRIDE(OP): Performed by: INTERNAL MEDICINE

## 2021-07-19 RX ORDER — GAUZE BANDAGE 2" X 2"
100 BANDAGE TOPICAL DAILY
Status: COMPLETED | OUTPATIENT
Start: 2021-07-20 | End: 2021-07-23

## 2021-07-19 RX ORDER — FOLIC ACID 1 MG/1
1 TABLET ORAL DAILY
Status: COMPLETED | OUTPATIENT
Start: 2021-07-20 | End: 2021-07-23

## 2021-07-19 RX ORDER — NICOTINE 21 MG/24HR
14 PATCH, TRANSDERMAL 24 HOURS TRANSDERMAL
Status: DISCONTINUED | OUTPATIENT
Start: 2021-07-19 | End: 2021-07-27 | Stop reason: HOSPADM

## 2021-07-19 RX ORDER — LORAZEPAM 2 MG/ML
0.5 INJECTION INTRAMUSCULAR EVERY 4 HOURS PRN
Status: DISCONTINUED | OUTPATIENT
Start: 2021-07-19 | End: 2021-07-26

## 2021-07-19 RX ORDER — LORAZEPAM 2 MG/ML
1 INJECTION INTRAMUSCULAR
Status: DISCONTINUED | OUTPATIENT
Start: 2021-07-19 | End: 2021-07-26

## 2021-07-19 RX ORDER — LORAZEPAM 1 MG/1
0.5 TABLET ORAL EVERY 4 HOURS PRN
Status: DISCONTINUED | OUTPATIENT
Start: 2021-07-19 | End: 2021-07-26

## 2021-07-19 RX ORDER — LORAZEPAM 2 MG/1
2 TABLET ORAL
Status: DISCONTINUED | OUTPATIENT
Start: 2021-07-19 | End: 2021-07-26

## 2021-07-19 RX ORDER — POTASSIUM CHLORIDE 20 MEQ/1
40 TABLET, EXTENDED RELEASE ORAL ONCE
Status: COMPLETED | OUTPATIENT
Start: 2021-07-19 | End: 2021-07-19

## 2021-07-19 RX ORDER — LORAZEPAM 1 MG/1
1 TABLET ORAL EVERY 4 HOURS PRN
Status: DISCONTINUED | OUTPATIENT
Start: 2021-07-19 | End: 2021-07-26

## 2021-07-19 RX ADMIN — LORAZEPAM 1 MG: 2 INJECTION INTRAMUSCULAR; INTRAVENOUS at 22:25

## 2021-07-19 RX ADMIN — NICOTINE POLACRILEX 2 MG: 2 GUM, CHEWING BUCCAL at 16:29

## 2021-07-19 RX ADMIN — FAMOTIDINE 20 MG: 20 TABLET ORAL at 16:30

## 2021-07-19 RX ADMIN — DOCUSATE SODIUM 50 MG AND SENNOSIDES 8.6 MG 2 TABLET: 8.6; 5 TABLET, FILM COATED ORAL at 16:30

## 2021-07-19 RX ADMIN — NICOTINE POLACRILEX 2 MG: 2 GUM, CHEWING BUCCAL at 12:39

## 2021-07-19 RX ADMIN — ACETYLCYSTEINE 12.5 MG/KG/HR: 200 SOLUTION ORAL; RESPIRATORY (INHALATION) at 02:16

## 2021-07-19 RX ADMIN — POTASSIUM CHLORIDE 40 MEQ: 1500 TABLET, EXTENDED RELEASE ORAL at 08:02

## 2021-07-19 RX ADMIN — POTASSIUM CHLORIDE AND SODIUM CHLORIDE: 900; 300 INJECTION, SOLUTION INTRAVENOUS at 08:02

## 2021-07-19 RX ADMIN — POTASSIUM CHLORIDE AND SODIUM CHLORIDE: 900; 300 INJECTION, SOLUTION INTRAVENOUS at 20:01

## 2021-07-19 RX ADMIN — ACETYLCYSTEINE 12.5 MG/KG/HR: 200 SOLUTION ORAL; RESPIRATORY (INHALATION) at 11:50

## 2021-07-19 RX ADMIN — NICOTINE POLACRILEX 2 MG: 2 GUM, CHEWING BUCCAL at 19:44

## 2021-07-19 RX ADMIN — ACETYLCYSTEINE 12.5 MG/KG/HR: 200 SOLUTION ORAL; RESPIRATORY (INHALATION) at 19:50

## 2021-07-19 RX ADMIN — LORAZEPAM 1 MG: 1 TABLET ORAL at 17:41

## 2021-07-19 RX ADMIN — LORAZEPAM 1 MG: 1 TABLET ORAL at 11:32

## 2021-07-19 RX ADMIN — ONDANSETRON 4 MG: 2 INJECTION INTRAMUSCULAR; INTRAVENOUS at 22:25

## 2021-07-19 RX ADMIN — NICOTINE POLACRILEX 2 MG: 2 GUM, CHEWING BUCCAL at 17:42

## 2021-07-19 RX ADMIN — NICOTINE 14 MG: 14 PATCH TRANSDERMAL at 12:38

## 2021-07-19 RX ADMIN — THIAMINE HYDROCHLORIDE: 100 INJECTION, SOLUTION INTRAMUSCULAR; INTRAVENOUS at 03:46

## 2021-07-19 ASSESSMENT — ENCOUNTER SYMPTOMS
ABDOMINAL PAIN: 0
EYE PAIN: 0
SORE THROAT: 0
PALPITATIONS: 0
BLURRED VISION: 0
FEVER: 0
SHORTNESS OF BREATH: 0
VOMITING: 0
NAUSEA: 0
MYALGIAS: 0
CHILLS: 0
DIZZINESS: 0
COUGH: 0

## 2021-07-19 ASSESSMENT — LIFESTYLE VARIABLES
TOTAL SCORE: 10
HAVE YOU EVER FELT YOU SHOULD CUT DOWN ON YOUR DRINKING: YES
AGITATION: NORMAL ACTIVITY
AUDITORY DISTURBANCES: NOT PRESENT
ORIENTATION AND CLOUDING OF SENSORIUM: ORIENTED AND CAN DO SERIAL ADDITIONS
HEADACHE, FULLNESS IN HEAD: NOT PRESENT
PAROXYSMAL SWEATS: NO SWEAT VISIBLE
TOTAL SCORE: 10
PAROXYSMAL SWEATS: BARELY PERCEPTIBLE SWEATING. PALMS MOIST
SUBSTANCE_ABUSE: 1
DOES PATIENT WANT TO STOP DRINKING: CANNOT ASSESS
ORIENTATION AND CLOUDING OF SENSORIUM: ORIENTED AND CAN DO SERIAL ADDITIONS
TOTAL SCORE: 2
TREMOR: NO TREMOR
TOTAL SCORE: 2
HEADACHE, FULLNESS IN HEAD: MILD
TREMOR: NO TREMOR
CONSUMPTION TOTAL: INCOMPLETE
VISUAL DISTURBANCES: NOT PRESENT
AUDITORY DISTURBANCES: NOT PRESENT
TOTAL SCORE: 2
AUDITORY DISTURBANCES: NOT PRESENT
ANXIETY: *
ANXIETY: *
AGITATION: NORMAL ACTIVITY
TOTAL SCORE: 9
AUDITORY DISTURBANCES: NOT PRESENT
PAROXYSMAL SWEATS: BARELY PERCEPTIBLE SWEATING. PALMS MOIST
HEADACHE, FULLNESS IN HEAD: MODERATE
HAVE YOU EVER FELT YOU SHOULD CUT DOWN ON YOUR DRINKING: YES
TREMOR: NO TREMOR
TOTAL SCORE: 2
ORIENTATION AND CLOUDING OF SENSORIUM: ORIENTED AND CAN DO SERIAL ADDITIONS
TREMOR: NO TREMOR
NAUSEA AND VOMITING: *
ON A TYPICAL DAY WHEN YOU DRINK ALCOHOL HOW MANY DRINKS DO YOU HAVE: 1
VISUAL DISTURBANCES: MILD SENSITIVITY
ALCOHOL_USE: YES
CONSUMPTION TOTAL: POSITIVE
EVER HAD A DRINK FIRST THING IN THE MORNING TO STEADY YOUR NERVES TO GET RID OF A HANGOVER: NO
ANXIETY: *
VISUAL DISTURBANCES: NOT PRESENT
NAUSEA AND VOMITING: MILD NAUSEA WITH NO VOMITING
TOTAL SCORE: 0
AGITATION: SOMEWHAT MORE THAN NORMAL ACTIVITY
HOW MANY TIMES IN THE PAST YEAR HAVE YOU HAD 5 OR MORE DRINKS IN A DAY: 0
EVER FELT BAD OR GUILTY ABOUT YOUR DRINKING: YES
EVER FELT BAD OR GUILTY ABOUT YOUR DRINKING: YES
NAUSEA AND VOMITING: NO NAUSEA AND NO VOMITING
HEADACHE, FULLNESS IN HEAD: MODERATE
ANXIETY: NO ANXIETY (AT EASE)
TOTAL SCORE: 2
ALCOHOL_USE: YES
AVERAGE NUMBER OF DAYS PER WEEK YOU HAVE A DRINK CONTAINING ALCOHOL: 5
HAVE PEOPLE ANNOYED YOU BY CRITICIZING YOUR DRINKING: NO
DOES PATIENT WANT TO STOP DRINKING: CANNOT ASSESS
HAVE PEOPLE ANNOYED YOU BY CRITICIZING YOUR DRINKING: NO
NAUSEA AND VOMITING: *
PAROXYSMAL SWEATS: BARELY PERCEPTIBLE SWEATING. PALMS MOIST
EVER HAD A DRINK FIRST THING IN THE MORNING TO STEADY YOUR NERVES TO GET RID OF A HANGOVER: NO
VISUAL DISTURBANCES: NOT PRESENT
ORIENTATION AND CLOUDING OF SENSORIUM: ORIENTED AND CAN DO SERIAL ADDITIONS
TOTAL SCORE: 2
AGITATION: *

## 2021-07-19 ASSESSMENT — COGNITIVE AND FUNCTIONAL STATUS - GENERAL
SUGGESTED CMS G CODE MODIFIER MOBILITY: CH
MOBILITY SCORE: 24
SUGGESTED CMS G CODE MODIFIER DAILY ACTIVITY: CH
DAILY ACTIVITIY SCORE: 24

## 2021-07-19 ASSESSMENT — PATIENT HEALTH QUESTIONNAIRE - PHQ9
SUM OF ALL RESPONSES TO PHQ9 QUESTIONS 1 AND 2: 0
1. LITTLE INTEREST OR PLEASURE IN DOING THINGS: NOT AT ALL
1. LITTLE INTEREST OR PLEASURE IN DOING THINGS: NOT AT ALL
SUM OF ALL RESPONSES TO PHQ9 QUESTIONS 1 AND 2: 0
2. FEELING DOWN, DEPRESSED, IRRITABLE, OR HOPELESS: NOT AT ALL
2. FEELING DOWN, DEPRESSED, IRRITABLE, OR HOPELESS: NOT AT ALL

## 2021-07-19 ASSESSMENT — PAIN DESCRIPTION - PAIN TYPE: TYPE: ACUTE PAIN

## 2021-07-19 NOTE — ED NOTES
lab from Lab called with critical result of tylenol at 313. Critical lab result read back to lab.   Dr. Cerda notified of critical lab result at 1946.  Critical lab result read back by Dr. Cerda.

## 2021-07-19 NOTE — ED NOTES
Assumed care of pt from tayler pizarro, secured room, pt still on monitors for vs. Pt sleeping. Left wrist lac with butterfly strips, controlled/intact. Side rails up, will monitor.

## 2021-07-19 NOTE — PROGRESS NOTES
Pharmacy Note: Poison control updated for tylenol overdose    Poison control case #:[id number]    Patient took unknown amount of tylenol in a suicide attempt. Tylenol was taken around 1500 with 4 hour level resulting in a level of 313.     Labs:  Recent Labs     07/18/21  1824   SODIUM 141   POTASSIUM 3.1*   CHLORIDE 101   CO2 19*   BUN 9   CREATININE 0.68   GLUCOSE 92   CALCIUM 9.4   ASTSGOT 30   ALTSGPT 15   ALBUMIN 4.8   TBILIRUBIN 0.2   INR 0.92          Levels:     7/18/2021 18:24   Acetaminophen -Tylenol 313 (HH)   Diagnostic Alcohol 183.8 (H)   Salicylates, Quant. <1 (L)     Spoke with Vianey HATFIELD from     Recommended Plan:  1. Initiate 3-bag method of NAC. First 2 bags to run over 5 hours. Third bag will be ran at double the rate (12 mg/kg/hr), per toxicology fellow recommendations.  2. Continue NAC therapy until APAP undetectable and INR/LFTs are normalized.   3. Labs: INR, CMP, APAP level 1-2 hours prior to completion of 3rd bag.     Van Mcdonald, GiovanyD

## 2021-07-19 NOTE — ED TRIAGE NOTES
"Chief Complaint   Patient presents with   • Drug Overdose     per EMS, patient took 40 pills of tylenol. States, daughter with father at the moment. states \"i'm a bad mom\"     Blood Pressure: 121/68, NIBP MAP (Calculated): 88, Pulse: (!) 134, Respiration: (!) 27, Temperature: 36.6 °C (97.9 °F), Height: 172.7 cm (5' 8\"), Weight: 63.5 kg (140 lb), BMI (Calculated): 21.29, BSA (Calculated): 1.7, Pulse Oximetry: 98 %, O2 Delivery Device: None - Room Air    Pt BIBA for complaints above. Per EMS and PD, patient's daughter is with the father and reason why took tylenol. Currently denies SI/HI/AH/VH. States was drinking today. L2K initiated. Patient A&Ox4, GCS 15, noted slurred speech. Patient changed to gown, connected to monitors.  "

## 2021-07-19 NOTE — ED PROVIDER NOTES
"ED Provider Note    CHIEF COMPLAINT  Chief Complaint   Patient presents with   • Drug Overdose     per EMS, patient took 40 pills of tylenol. States, daughter with father at the moment. states \"i'm a bad mom\"       HPI  Lexie Rush is a 26 y.o. female who presents to the emergency department for report of overdose. Past medical history as documented below. She explains that she chronically abuses illicit substances including methamphetamine and heroin. States that she does not inject. Today however she additionally drank alcohol and then ultimately took an unknown amount of Tylenol. EMS was initially told 40 tablets and then patient regular reporting somewhere between 15-20 to  myself. Patient she is having increasing depression. Three days ago cut her left wrist and bandaged herself. Today mother became aware of her actions this afternoon and therefore brought her to the ER currently.    REVIEW OF SYSTEMS  See HPI for further details. All other systems are negative.     PAST MEDICAL HISTORY   has a past medical history of Henoch-Schonlein purpura (HCC), Miscarriage, Supervision of normal first pregnancy (5/11/2010), and Teen pregnancy (5/11/2010).    SOCIAL HISTORY  Social History     Tobacco Use   • Smoking status: Current Every Day Smoker     Types: Cigarettes   • Smokeless tobacco: Never Used   • Tobacco comment: smoke e cigarettes   Substance and Sexual Activity   • Alcohol use: Yes     Comment: 1/mo    • Drug use: Yes     Types: Marijuana     Comment: socially    • Sexual activity: Yes     Partners: Male       SURGICAL HISTORY   has a past surgical history that includes pelviscopy (4/7/2014).    CURRENT MEDICATIONS  Home Medications     Reviewed by Cristofer Curry, PhT (Pharmacy Tech) on 07/18/21 at 2007  Med List Status: Complete   Medication Last Dose Status        Patient Olaf Taking any Medications                       ALLERGIES  Allergies   Allergen Reactions   • Amoxicillin Unspecified " "    Causes Henoch Purpura. Unknown allergic reaction   • Ibuprofen Unspecified     Causes Henoch Purpura. Takes Advil without problems.       PHYSICAL EXAM  VITAL SIGNS: /77   Pulse (!) 105   Temp 36.7 °C (98.1 °F) (Temporal)   Resp 16   Ht 1.727 m (5' 8\")   Wt 63.5 kg (140 lb)   SpO2 100%   BMI 21.29 kg/m²  @GABRIEL[[id number]::@   Pulse ox interpretation: I interpret this pulse ox as normal.  Constitutional: Alert in no apparent distress.  HENT: No signs of trauma, Bilateral external ears normal, Nose normal.   Eyes: Pupils are equal and reactive  Neck: Normal range of motion, No tenderness, Supple  Cardiovascular: Regular rate and rhythm, no murmurs.   Thorax & Lungs: Normal breath sounds, No respiratory distress, No wheezing, No chest tenderness.   Abdomen: Bowel sounds normal, Soft  Skin: Warm, Dry, No erythema 3 cm partial thickness laceration to left volar forearm with Steri-Strips in place.  Extremities: Intact distal pulses  Musculoskeletal: Good range of motion in all major joints. No tenderness to palpation or major deformities noted.   Neurologic: Alert , alert, slurred speech  Psychiatric: Affect normal, Judgment normal, Mood normal.       DIAGNOSTIC STUDIES / PROCEDURES      LABS  Results for orders placed or performed during the hospital encounter of 07/18/21   CBC WITH DIFFERENTIAL   Result Value Ref Range    WBC 8.3 4.8 - 10.8 K/uL    RBC 4.71 4.20 - 5.40 M/uL    Hemoglobin 14.4 12.0 - 16.0 g/dL    Hematocrit 42.8 37.0 - 47.0 %    MCV 90.9 81.4 - 97.8 fL    MCH 30.6 27.0 - 33.0 pg    MCHC 33.6 33.6 - 35.0 g/dL    RDW 43.8 35.9 - 50.0 fL    Platelet Count 298 164 - 446 K/uL    MPV 9.7 9.0 - 12.9 fL    Neutrophils-Polys 45.80 44.00 - 72.00 %    Lymphocytes 45.90 (H) 22.00 - 41.00 %    Monocytes 3.90 0.00 - 13.40 %    Eosinophils 3.70 0.00 - 6.90 %    Basophils 0.50 0.00 - 1.80 %    Immature Granulocytes 0.20 0.00 - 0.90 %    Nucleated RBC 0.00 /100 WBC    Neutrophils (Absolute) 3.79 2.00 - 7.15 " K/uL    Lymphs (Absolute) 3.80 1.00 - 4.80 K/uL    Monos (Absolute) 0.32 0.00 - 0.85 K/uL    Eos (Absolute) 0.31 0.00 - 0.51 K/uL    Baso (Absolute) 0.04 0.00 - 0.12 K/uL    Immature Granulocytes (abs) 0.02 0.00 - 0.11 K/uL    NRBC (Absolute) 0.00 K/uL   COMP METABOLIC PANEL   Result Value Ref Range    Sodium 141 135 - 145 mmol/L    Potassium 3.1 (L) 3.6 - 5.5 mmol/L    Chloride 101 96 - 112 mmol/L    Co2 19 (L) 20 - 33 mmol/L    Anion Gap 21.0 (H) 7.0 - 16.0    Glucose 92 65 - 99 mg/dL    Bun 9 8 - 22 mg/dL    Creatinine 0.68 0.50 - 1.40 mg/dL    Calcium 9.4 8.5 - 10.5 mg/dL    AST(SGOT) 30 12 - 45 U/L    ALT(SGPT) 15 2 - 50 U/L    Alkaline Phosphatase 66 30 - 99 U/L    Total Bilirubin 0.2 0.1 - 1.5 mg/dL    Albumin 4.8 3.2 - 4.9 g/dL    Total Protein 7.4 6.0 - 8.2 g/dL    Globulin 2.6 1.9 - 3.5 g/dL    A-G Ratio 1.8 g/dL   LIPASE   Result Value Ref Range    Lipase 31 11 - 82 U/L   PROTHROMBIN TIME   Result Value Ref Range    PT 12.1 12.0 - 14.6 sec    INR 0.92 0.87 - 1.13   APTT   Result Value Ref Range    APTT 26.7 24.7 - 36.0 sec   DIAGNOSTIC ALCOHOL   Result Value Ref Range    Diagnostic Alcohol 183.8 (H) 0.0 - 10.0 mg/dL   URINE DRUG SCREEN (TRIAGE)   Result Value Ref Range    Amphetamines Urine Positive (A) Negative    Barbiturates Negative Negative    Benzodiazepines Negative Negative    Cocaine Metabolite Negative Negative    Methadone Negative Negative    Opiates Positive (A) Negative    Oxycodone Negative Negative    Phencyclidine -Pcp Negative Negative    Propoxyphene Negative Negative    Cannabinoid Metab Negative Negative   HCG Qual Serum   Result Value Ref Range    Beta-Hcg Qualitative Serum Negative Negative   Salicylate   Result Value Ref Range    Salicylates, Quant. <1 (L) 15 - 25 mg/dL   ACETAMINOPHEN   Result Value Ref Range    Acetaminophen -Tylenol 313 (HH) 10 - 30 ug/mL   ESTIMATED GFR   Result Value Ref Range    GFR If African American >60 >60 mL/min/1.73 m 2    GFR If Non African American  >60 >60 mL/min/1.73 m 2   ACETAMINOPHEN   Result Value Ref Range    Acetaminophen -Tylenol 328 (HH) 10 - 30 ug/mL   POC BREATHALIZER   Result Value Ref Range    POC Breathalizer 0.163 (A) 0.00 - 0.01 Percent   EKG   Result Value Ref Range    Report       [facility] Emergency Dept.    Test Date:  2021  Pt Name:    KARTHIK LEAVITT               Department: ER  MRN:        [id number]                      Room:        05  Gender:     Female                       Technician: [id number]  :        [id number]                   Requested By:ENDY PETERSON  Order #:    [id number]                    Reading MD:    Measurements  Intervals                                Axis  Rate:       128                          P:          76  CO:         117                          QRS:        85  QRSD:       86                           T:          -80  QT:         328  QTc:        479    Interpretive Statements  SINUS TACHYCARDIA  JORDON, CONSIDER BIATRIAL ABNORMALITIES  INFERIOR Q WAVES, PROBABLY NORMAL VARIATION  BORDERLINE ST DEPRESSION, DIFFUSE LEADS  ABNORMAL T, CONSIDER ISCHEMIA, DIFFUSE LEADS  BORDERLINE PROLONGED QT INTERVAL  BASELINE WANDER IN LEAD(S) V1  No previous ECG available for comparison           RADIOLOGY  No orders to display       CRITICAL CARE  The very real possibilty of a deterioration of this patient's condition required the highest level of my preparedness for sudden, emergent intervention.  I provided critical care services, which included medication orders, frequent reevaluations of the patient's condition and response to treatment, ordering and reviewing test results, and discussing the case with various consultants.  The critical care time associated with the care of the patient was 35 minutes. Review chart for interventions. This time is exclusive of any other billable procedures.       COURSE & MEDICAL DECISION MAKING  Pertinent Labs & Imaging studies reviewed. (See chart for  details)    26-year-old female presented emergency room with acute suicidal ideation and attempts. She had wrist cutting earlier in the week and tonight with intentional medication overdose of Tylenol. There was a single pill of Bactrim in the container also. For our Tylenol level significantly elevated. Poison control contacted. Patient emergently started on NAC. I discussed case with hospitals will continue ongoing inpatient acute care. Forearm laceration was approximated with Steri-Strips prior to arrival.      FINAL IMPRESSION  1. Intentional drug overdose, initial encounter (HCC)    2. Suicidal ideation    3. Heroin abuse (HCC)    4. Methamphetamine abuse (HCC)    5. Intentional acetaminophen overdose, initial encounter (HCC)    6. Left forearm laceration        Electronically signed by: Teto Cerda M.D., 7/18/2021 7:17 PM

## 2021-07-19 NOTE — H&P
"Hospital Medicine History & Physical Note    Date of Service  7/18/2021    Primary Care Physician  Pcp Pt States None    Consultants    Poison control      Code Status  Full Code    Chief Complaint  Chief Complaint   Patient presents with   • Drug Overdose     per EMS, patient took 40 pills of tylenol. States, daughter with father at the moment. states \"i'm a bad mom\"       History of Presenting Illness  Lexie Rush is a 26 y.o. female with past medical history of polysubstance abuse, depression, Henoch-Schönlein purpura, who presented 7/18/2021 with drug overdose.  Patient is sleeping and difficult to arouse for conversation.  She is moaning yes or no only, therefore history is mostly obtained from medical chart.  Patient has long history of alcohol, methamphetamine and heroin abuse by smoking, reportedly denied injections.  She has been depressed, and cut her wrists 3 days ago and bandaged herself.  Today she was feeling especially depressed and took 40 tablets of Tylenol at 3 PM.  Evaluation in ER significant for acetaminophen levels 313, diagnostic alcohol 183, UDS positive for opiates and amphetamine.  Poison control contacted and patient was started on NAC per protocol    I discussed the plan of care with bedside RN.    Review of Systems  Review of Systems   Unable to perform ROS: Critical illness       Past Medical History   has a past medical history of Henoch-Schonlein purpura (HCC), Miscarriage, Supervision of normal first pregnancy (5/11/2010), and Teen pregnancy (5/11/2010).    Surgical History   has a past surgical history that includes pelviscopy (4/7/2014).     Family History  family history includes Cancer in an other family member; Hypertension in her mother and paternal aunt.   Family history reviewed with patient. There is no family history that is pertinent to the chief complaint.     Social History   reports that she has been smoking cigarettes. She has never used smokeless tobacco. She " reports current alcohol use. She reports current drug use. Drug: Marijuana.    Allergies  Allergies   Allergen Reactions   • Amoxicillin Unspecified     Causes Henoch Purpura. Unknown allergic reaction   • Ibuprofen Unspecified     Causes Henoch Purpura. Takes Advil without problems.       Medications  None       Physical Exam  Temp:  [36.6 °C (97.9 °F)] 36.6 °C (97.9 °F)  Pulse:  [105-134] 131  Resp:  [20-27] 20  BP: (107-121)/(61-68) 110/67  SpO2:  [96 %-98 %] 98 %    Physical Exam  Vitals and nursing note reviewed.   Constitutional:       General: She is not in acute distress.     Appearance: Normal appearance.   HENT:      Head: Normocephalic and atraumatic.      Nose: Nose normal.      Mouth/Throat:      Mouth: Mucous membranes are moist.   Eyes:      Extraocular Movements: Extraocular movements intact.      Pupils: Pupils are equal, round, and reactive to light.   Cardiovascular:      Rate and Rhythm: Normal rate and regular rhythm.   Pulmonary:      Effort: Pulmonary effort is normal.      Breath sounds: Normal breath sounds.   Abdominal:      General: Abdomen is flat. There is no distension.      Tenderness: There is abdominal tenderness in the epigastric area. There is no guarding or rebound.   Musculoskeletal:         General: No swelling or deformity. Normal range of motion.      Cervical back: Normal range of motion and neck supple.   Skin:     General: Skin is warm and dry.      Comments: 3 cm partial thickness laceration to left volar forearm with Steri-Strips in place.   Neurological:      General: No focal deficit present.      Mental Status: She is alert.      Comments: Difficult to arouse for conversation.  Moves all 4 extremities.   Psychiatric:         Mood and Affect: Mood normal.         Behavior: Behavior normal.         Laboratory:  Recent Labs     07/18/21  1824   WBC 8.3   RBC 4.71   HEMOGLOBIN 14.4   HEMATOCRIT 42.8   MCV 90.9   MCH 30.6   MCHC 33.6   RDW 43.8   PLATELETCT 298   MPV 9.7      Recent Labs     07/18/21  1824   SODIUM 141   POTASSIUM 3.1*   CHLORIDE 101   CO2 19*   GLUCOSE 92   BUN 9   CREATININE 0.68   CALCIUM 9.4     Recent Labs     07/18/21  1824   ALTSGPT 15   ASTSGOT 30   ALKPHOSPHAT 66   TBILIRUBIN 0.2   LIPASE 31   GLUCOSE 92     Recent Labs     07/18/21  1824   APTT 26.7   INR 0.92     No results for input(s): NTPROBNP in the last 72 hours.      No results for input(s): TROPONINT in the last 72 hours.    Imaging:  No orders to display           Assessment/Plan:  I anticipate this patient is appropriate for observation status at this time.    Tylenol overdose, intentional self-harm, initial encounter (HCC)  Assessment & Plan  Initial Tylenol level 313.  Reportedly took 40 tablets of Tylenol at 3 PM.  Continue NAC per protocol  Liver enzymes are not elevated.  Repeat liver panel  Legal hold initiated in ER  Suicidal precaution, fall, aspiration, seizure precautions  Psychiatry consult      Polysubstance abuse (HCC)  Assessment & Plan  UDS positive for methamphetamine, opioids  We will plan to provide with illicit drug counseling when alcohol intoxication resolves    Hypokalemia  Assessment & Plan  Replacement ordered.  Check magnesium    Alcohol intoxication (HCC)  Assessment & Plan  Supportive care  Monitor for withdrawal      VTE prophylaxis: enoxaparin ppx

## 2021-07-19 NOTE — PROGRESS NOTES
4 Eyes Skin Assessment Completed by LIU Yang and LIU Cabello.    Head WDL  Ears WDL  Nose WDL  Mouth WDL  Neck WDL  Breast/Chest WDL  Shoulder Blades WDL  Spine WDL  (R) Arm/Elbow/Hand WDL  (L) Arm/Elbow/Hand Wound to wrist-self inflicted   Abdomen WDL  Groin WDL  Scrotum/Coccyx/Buttocks WDL  (R) Leg WDL  (L) Leg WDL  (R) Heel/Foot/Toe WDL  (L) Heel/Foot/Toe WDL          Devices In Places Tele Box      Interventions In Place Pillows    Possible Skin Injury No    Pictures Uploaded Into Epic Yes  Wound Consult Placed Yes  RN Wound Prevention Protocol Ordered no

## 2021-07-19 NOTE — ASSESSMENT & PLAN NOTE
Tobacco cessation counseling and education provided for 4 minutes. Nicotine replacement options provided including patch, and further medical treatments including Wellbutrin and Chantix. As well as over the counter options of lozenges and gum.

## 2021-07-19 NOTE — PROGRESS NOTES
[facility] Medicine Daily Progress Note    Date of Service  7/19/2021    Chief Complaint  Lexie Rush is a 26 y.o. female admitted 7/18/2021 with tylenol overdose.       Interval Problem Update  Patient was seen and examined at bedside.  No acute events overnight. Patient is resting comfortably in bed and in no acute distress.     Denies suicidal/homicidal ideation  Tylenol level 313 --> 64  Acetylcysteine infusion  Q12 hr labs and NAC until tylenol level undetectable per poison control   Await psych recs    I have personally seen and examined the patient at bedside. I discussed the plan of care with patient, bedside RN, charge RN and .    Consultants/Specialty  psychiatry    Code Status  Full Code    Disposition  Patient is not medically cleared.   Anticipate discharge to D.  I have placed the appropriate orders for post-discharge needs.    Review of Systems  Review of Systems   Constitutional: Negative for chills and fever.   HENT: Negative for congestion and sore throat.    Eyes: Negative for blurred vision and pain.   Respiratory: Negative for cough and shortness of breath.    Cardiovascular: Negative for chest pain and palpitations.   Gastrointestinal: Negative for abdominal pain, nausea and vomiting.   Genitourinary: Negative for dysuria and frequency.   Musculoskeletal: Negative for myalgias.   Neurological: Negative for dizziness.   Psychiatric/Behavioral: Positive for substance abuse. Negative for suicidal ideas.        Physical Exam  Temp:  [36.5 °C (97.7 °F)-37.4 °C (99.3 °F)] 37.4 °C (99.3 °F)  Pulse:  [] 100  Resp:  [16-27] 18  BP: (106-127)/(61-88) 116/65  SpO2:  [94 %-100 %] 98 %    Physical Exam  Constitutional:       General: She is not in acute distress.     Appearance: She is not toxic-appearing.   HENT:      Head: Normocephalic.      Right Ear: External ear normal.      Left Ear: External ear normal.      Nose: Nose normal.      Mouth/Throat:      Pharynx: No oropharyngeal  exudate.   Eyes:      General: No scleral icterus.     Pupils: Pupils are equal, round, and reactive to light.   Cardiovascular:      Rate and Rhythm: Normal rate and regular rhythm.   Pulmonary:      Breath sounds: No wheezing.   Abdominal:      Palpations: Abdomen is soft.      Tenderness: There is no abdominal tenderness. There is no guarding or rebound.   Musculoskeletal:         General: No swelling or deformity.   Skin:     Coloration: Skin is not jaundiced.      Findings: No bruising.   Neurological:      General: No focal deficit present.      Mental Status: She is alert.   Psychiatric:         Attention and Perception: She does not perceive auditory or visual hallucinations.         Mood and Affect: Mood normal.         Behavior: Behavior normal.         Thought Content: Thought content normal. Thought content does not include homicidal or suicidal ideation. Thought content does not include suicidal plan.         Judgment: Judgment normal.         Fluids    Intake/Output Summary (Last 24 hours) at 7/19/2021 1316  Last data filed at 7/19/2021 1000  Gross per 24 hour   Intake 200 ml   Output --   Net 200 ml       Laboratory  Recent Labs     07/18/21 1824 07/19/21  0325   WBC 8.3 6.3   RBC 4.71 4.36   HEMOGLOBIN 14.4 13.3   HEMATOCRIT 42.8 39.5   MCV 90.9 90.6   MCH 30.6 30.5   MCHC 33.6 33.7   RDW 43.8 43.5   PLATELETCT 298 272   MPV 9.7 9.6     Recent Labs     07/18/21 1824 07/19/21 0325 07/19/21  0622   SODIUM 141 133* 133*   POTASSIUM 3.1* 3.6 3.2*   CHLORIDE 101 101 101   CO2 19* 17* 19*   GLUCOSE 92 130* 135*   BUN 9 8 8   CREATININE 0.68 0.57 0.61   CALCIUM 9.4 8.0* 8.1*     Recent Labs     07/18/21 1824 07/19/21  0622   APTT 26.7  --    INR 0.92 1.22*               Imaging  No orders to display        Assessment/Plan  * Tylenol overdose, intentional self-harm, initial encounter (HCC)  Assessment & Plan  Reportedly took 40 tablets of Tylenol at 3 PM.  Tylenol level 313 --> 64  Continue NAC per  protocol  Legal hold   Suicidal precaution, fall, aspiration, seizure precautions  Psychiatry consult  Q12 hr labs and NAC until tylenol level undetectable per poison control       Alcohol intoxication (HCC)  Assessment & Plan  Supportive care  Monitor for withdrawal  CIWA    Tobacco abuse  Assessment & Plan  Tobacco cessation counseling and education provided for 4 minutes. Nicotine replacement options provided including patch, and further medical treatments including Wellbutrin and Chantix. As well as over the counter options of lozenges and gum.      Polysubstance abuse (HCC)  Assessment & Plan  UDS positive for methamphetamine, opioids  We will plan to provide with illicit drug counseling when alcohol intoxication resolves    Hypokalemia  Assessment & Plan  Monitor and replace       VTE prophylaxis: enoxaparin ppx    I have performed a physical exam and reviewed and updated ROS and Plan today (7/19/2021). In review of yesterday's note (7/18/2021), there are no changes except as documented above.

## 2021-07-19 NOTE — CONSULTS
Brief Behavioral Health Note:    Attempted to interview patient however she was sleeping and very difficult to arouse. Will return at a later time when patient is alert and able to engage in the interview process.    Thank you,    Elaine Gomez, Ph.D., [facility]

## 2021-07-19 NOTE — CARE PLAN
The patient is Watcher - Medium risk of patient condition declining or worsening    Shift Goals  Clinical Goals: safety and comfort  Patient Goals: safety and comfort    Shift summary- Patient remained free of acute events this shift. Remained safe and free from injury. Vital signs stable on room air. IV fluids running. K+ replaced. Wound care to bedside to change dressing. Patient reporting moderate withdrawal symptoms to nicotine, alcohol, and opioids. Nicotine patch placed and CIWAs trended.     Patient is not progressing towards the following goals:      Problem: Optimal Care for Alcohol Withdrawal  Goal: Optimal Care for the alcohol withdrawal patient  Outcome: Not Progressing  Note: Patient to remain free of symptoms of alcohol withdrawal      Problem: Psychosocial  Goal: Patient's level of anxiety will decrease  Outcome: Not Progressing  Note: Patient to have decreased levels of anxiety

## 2021-07-19 NOTE — DISCHARGE PLANNING
Anticipated Discharge Disposition:   TBD    Action:   Discussed discharge planning needs during rounds. Pt is on Legal Hold. Legal Hold paper faxed to [facility]. Per MD, pt is not medically cleared.    Barriers to Discharge:   Medical clearance  Legal Hold    Plan:   Hospital Care Management will continue to follow and assist with discharge planning needs.

## 2021-07-19 NOTE — ASSESSMENT & PLAN NOTE
Reportedly took 40 tablets of Tylenol at 3 PM.  Tylenol level 313 --> 64  completed NAC per protocol  Legal hold on admission, extended by psychiatry team.  Improved, LFTs improved    Tylenol overdose treated and resolved.    Intentional self-harm still active  - Suicidal precaution, fall, aspiration, seizure precautions  - As per psychiatric services, patient will need inpatient psychiatric facility admission once she is medically cleared.    - At this time patient is not medically cleared, currently undergoing withdrawal from her multiple illicit drugs.  - Legal hold extended and court petition filed via [employer] 7/21

## 2021-07-19 NOTE — ED NOTES
Med rec attempted per patient but patient would not respond, med rec updated per mother ambrocio via phone.  Mother reports patient has no known home medications and reported allergy to amox.

## 2021-07-19 NOTE — PROGRESS NOTES
Assumed care of patient. Chart review done. Patient AAOx4. IV fluids running. 1:1 sitter at bedside, room verified to be safe.  Pain control regiment reviewed. Bed low and locked.

## 2021-07-19 NOTE — ED NOTES
"Asked patient to take EKG, per MD order, but patient unwilling and talking back saying \"I know my rights\". Trying to explain to patient importance of EKG for elevated HR, but patient still talking back not allowing EKG  "

## 2021-07-19 NOTE — ED NOTES
Urine sample sent to lab. Patient with minimal assistance ambulated to restroom, accompanied by yaron

## 2021-07-19 NOTE — ASSESSMENT & PLAN NOTE
Supportive care  Monitor for withdrawal  Continue CIWA protocol, patient continues to require Ativan.  3 days without seizure, medically cleared now

## 2021-07-19 NOTE — ED NOTES
[facility] to order meds, call poison control for critical acetaminophen.

## 2021-07-19 NOTE — ASSESSMENT & PLAN NOTE
UDS positive for methamphetamine, opioids  We will plan to provide with illicit drug counseling when alcohol intoxication resolves.   7/20-patient counseled with nurse present in the room about illicit drug use cessation

## 2021-07-20 LAB
ALBUMIN SERPL BCP-MCNC: 3.8 G/DL (ref 3.2–4.9)
ALBUMIN/GLOB SERPL: 1.7 G/DL
ALP SERPL-CCNC: 45 U/L (ref 30–99)
ALT SERPL-CCNC: 12 U/L (ref 2–50)
ANION GAP SERPL CALC-SCNC: 11 MMOL/L (ref 7–16)
APAP SERPL-MCNC: <5 UG/ML (ref 10–30)
APAP SERPL-MCNC: <5 UG/ML (ref 10–30)
AST SERPL-CCNC: 19 U/L (ref 12–45)
BASOPHILS # BLD AUTO: 0.5 % (ref 0–1.8)
BASOPHILS # BLD: 0.04 K/UL (ref 0–0.12)
BILIRUB SERPL-MCNC: 0.5 MG/DL (ref 0.1–1.5)
BUN SERPL-MCNC: 5 MG/DL (ref 8–22)
CALCIUM SERPL-MCNC: 8.8 MG/DL (ref 8.5–10.5)
CHLORIDE SERPL-SCNC: 105 MMOL/L (ref 96–112)
CO2 SERPL-SCNC: 19 MMOL/L (ref 20–33)
CREAT SERPL-MCNC: 0.59 MG/DL (ref 0.5–1.4)
EOSINOPHIL # BLD AUTO: 0.1 K/UL (ref 0–0.51)
EOSINOPHIL NFR BLD: 1.3 % (ref 0–6.9)
ERYTHROCYTE [DISTWIDTH] IN BLOOD BY AUTOMATED COUNT: 43.9 FL (ref 35.9–50)
GLOBULIN SER CALC-MCNC: 2.3 G/DL (ref 1.9–3.5)
GLUCOSE SERPL-MCNC: 122 MG/DL (ref 65–99)
HCT VFR BLD AUTO: 39.1 % (ref 37–47)
HGB BLD-MCNC: 13.2 G/DL (ref 12–16)
IMM GRANULOCYTES # BLD AUTO: 0.02 K/UL (ref 0–0.11)
IMM GRANULOCYTES NFR BLD AUTO: 0.3 % (ref 0–0.9)
INR PPP: 1.09 (ref 0.87–1.13)
LYMPHOCYTES # BLD AUTO: 1.89 K/UL (ref 1–4.8)
LYMPHOCYTES NFR BLD: 25.4 % (ref 22–41)
MAGNESIUM SERPL-MCNC: 1.9 MG/DL (ref 1.5–2.5)
MCH RBC QN AUTO: 30.5 PG (ref 27–33)
MCHC RBC AUTO-ENTMCNC: 33.8 G/DL (ref 33.6–35)
MCV RBC AUTO: 90.3 FL (ref 81.4–97.8)
MONOCYTES # BLD AUTO: 0.38 K/UL (ref 0–0.85)
MONOCYTES NFR BLD AUTO: 5.1 % (ref 0–13.4)
NEUTROPHILS # BLD AUTO: 5.02 K/UL (ref 2–7.15)
NEUTROPHILS NFR BLD: 67.4 % (ref 44–72)
NRBC # BLD AUTO: 0 K/UL
NRBC BLD-RTO: 0 /100 WBC
PHOSPHATE SERPL-MCNC: 1.9 MG/DL (ref 2.5–4.5)
PLATELET # BLD AUTO: 253 K/UL (ref 164–446)
PMV BLD AUTO: 9.7 FL (ref 9–12.9)
POTASSIUM SERPL-SCNC: 4.2 MMOL/L (ref 3.6–5.5)
PROT SERPL-MCNC: 6.1 G/DL (ref 6–8.2)
PROTHROMBIN TIME: 13.8 SEC (ref 12–14.6)
RBC # BLD AUTO: 4.33 M/UL (ref 4.2–5.4)
SODIUM SERPL-SCNC: 135 MMOL/L (ref 135–145)
WBC # BLD AUTO: 7.5 K/UL (ref 4.8–10.8)

## 2021-07-20 PROCEDURE — A9270 NON-COVERED ITEM OR SERVICE: HCPCS | Performed by: INTERNAL MEDICINE

## 2021-07-20 PROCEDURE — 36415 COLL VENOUS BLD VENIPUNCTURE: CPT

## 2021-07-20 PROCEDURE — 700111 HCHG RX REV CODE 636 W/ 250 OVERRIDE (IP): Performed by: INTERNAL MEDICINE

## 2021-07-20 PROCEDURE — 83735 ASSAY OF MAGNESIUM: CPT

## 2021-07-20 PROCEDURE — 99233 SBSQ HOSP IP/OBS HIGH 50: CPT | Performed by: INTERNAL MEDICINE

## 2021-07-20 PROCEDURE — A9270 NON-COVERED ITEM OR SERVICE: HCPCS | Performed by: STUDENT IN AN ORGANIZED HEALTH CARE EDUCATION/TRAINING PROGRAM

## 2021-07-20 PROCEDURE — 80143 DRUG ASSAY ACETAMINOPHEN: CPT | Mod: 91

## 2021-07-20 PROCEDURE — 85610 PROTHROMBIN TIME: CPT

## 2021-07-20 PROCEDURE — 700101 HCHG RX REV CODE 250: Performed by: INTERNAL MEDICINE

## 2021-07-20 PROCEDURE — 770020 HCHG ROOM/CARE - TELE (206)

## 2021-07-20 PROCEDURE — 700102 HCHG RX REV CODE 250 W/ 637 OVERRIDE(OP): Performed by: STUDENT IN AN ORGANIZED HEALTH CARE EDUCATION/TRAINING PROGRAM

## 2021-07-20 PROCEDURE — 84100 ASSAY OF PHOSPHORUS: CPT

## 2021-07-20 PROCEDURE — 700102 HCHG RX REV CODE 250 W/ 637 OVERRIDE(OP): Performed by: INTERNAL MEDICINE

## 2021-07-20 PROCEDURE — 85025 COMPLETE CBC W/AUTO DIFF WBC: CPT

## 2021-07-20 PROCEDURE — 90791 PSYCH DIAGNOSTIC EVALUATION: CPT | Performed by: SOCIAL WORKER

## 2021-07-20 PROCEDURE — 80053 COMPREHEN METABOLIC PANEL: CPT

## 2021-07-20 RX ADMIN — HALOPERIDOL LACTATE 5 MG: 5 INJECTION, SOLUTION INTRAMUSCULAR at 05:33

## 2021-07-20 RX ADMIN — LORAZEPAM 2 MG: 2 TABLET ORAL at 16:55

## 2021-07-20 RX ADMIN — LORAZEPAM 1 MG: 1 TABLET ORAL at 14:48

## 2021-07-20 RX ADMIN — FOLIC ACID 1 MG: 1 TABLET ORAL at 05:23

## 2021-07-20 RX ADMIN — POTASSIUM CHLORIDE AND SODIUM CHLORIDE: 900; 300 INJECTION, SOLUTION INTRAVENOUS at 17:39

## 2021-07-20 RX ADMIN — THERA TABS 1 TABLET: TAB at 05:23

## 2021-07-20 RX ADMIN — FAMOTIDINE 20 MG: 20 TABLET ORAL at 05:23

## 2021-07-20 RX ADMIN — FAMOTIDINE 20 MG: 20 TABLET ORAL at 17:38

## 2021-07-20 RX ADMIN — NICOTINE 14 MG: 14 PATCH TRANSDERMAL at 05:23

## 2021-07-20 RX ADMIN — POTASSIUM CHLORIDE AND SODIUM CHLORIDE: 900; 300 INJECTION, SOLUTION INTRAVENOUS at 07:58

## 2021-07-20 RX ADMIN — ENOXAPARIN SODIUM 40 MG: 40 INJECTION SUBCUTANEOUS at 05:23

## 2021-07-20 RX ADMIN — LORAZEPAM 2 MG: 2 TABLET ORAL at 10:40

## 2021-07-20 RX ADMIN — LORAZEPAM 1 MG: 1 TABLET ORAL at 03:10

## 2021-07-20 RX ADMIN — Medication 100 MG: at 05:23

## 2021-07-20 ASSESSMENT — LIFESTYLE VARIABLES
HEADACHE, FULLNESS IN HEAD: VERY MILD
TOTAL SCORE: 12
TREMOR: TREMOR NOT VISIBLE BUT CAN BE FELT, FINGERTIP TO FINGERTIP
NAUSEA AND VOMITING: MILD NAUSEA WITH NO VOMITING
HEADACHE, FULLNESS IN HEAD: NOT PRESENT
PAROXYSMAL SWEATS: BARELY PERCEPTIBLE SWEATING. PALMS MOIST
ANXIETY: *
ORIENTATION AND CLOUDING OF SENSORIUM: ORIENTED AND CAN DO SERIAL ADDITIONS
ANXIETY: MILDLY ANXIOUS
AUDITORY DISTURBANCES: NOT PRESENT
NAUSEA AND VOMITING: MILD NAUSEA WITH NO VOMITING
ANXIETY: NO ANXIETY (AT EASE)
PAROXYSMAL SWEATS: BARELY PERCEPTIBLE SWEATING. PALMS MOIST
AGITATION: SOMEWHAT MORE THAN NORMAL ACTIVITY
TOTAL SCORE: 11
AUDITORY DISTURBANCES: NOT PRESENT
AGITATION: NORMAL ACTIVITY
HEADACHE, FULLNESS IN HEAD: VERY MILD
VISUAL DISTURBANCES: VERY MILD SENSITIVITY
ANXIETY: *
HEADACHE, FULLNESS IN HEAD: MILD
TREMOR: TREMOR NOT VISIBLE BUT CAN BE FELT, FINGERTIP TO FINGERTIP
VISUAL DISTURBANCES: NOT PRESENT
AUDITORY DISTURBANCES: NOT PRESENT
PAROXYSMAL SWEATS: BARELY PERCEPTIBLE SWEATING. PALMS MOIST
TREMOR: *
VISUAL DISTURBANCES: NOT PRESENT
ANXIETY: NO ANXIETY (AT EASE)
PAROXYSMAL SWEATS: NO SWEAT VISIBLE
NAUSEA AND VOMITING: NO NAUSEA AND NO VOMITING
VISUAL DISTURBANCES: NOT PRESENT
VISUAL DISTURBANCES: NOT PRESENT
ORIENTATION AND CLOUDING OF SENSORIUM: ORIENTED AND CAN DO SERIAL ADDITIONS
AUDITORY DISTURBANCES: NOT PRESENT
TOTAL SCORE: 4
AGITATION: *
HEADACHE, FULLNESS IN HEAD: MODERATE
PAROXYSMAL SWEATS: BARELY PERCEPTIBLE SWEATING. PALMS MOIST
TOTAL SCORE: 8
ORIENTATION AND CLOUDING OF SENSORIUM: ORIENTED AND CAN DO SERIAL ADDITIONS
ANXIETY: NO ANXIETY (AT EASE)
ORIENTATION AND CLOUDING OF SENSORIUM: ORIENTED AND CAN DO SERIAL ADDITIONS
HEADACHE, FULLNESS IN HEAD: VERY MILD
PAROXYSMAL SWEATS: NO SWEAT VISIBLE
PAROXYSMAL SWEATS: NO SWEAT VISIBLE
NAUSEA AND VOMITING: NO NAUSEA AND NO VOMITING
TOTAL SCORE: 2
AGITATION: NORMAL ACTIVITY
VISUAL DISTURBANCES: NOT PRESENT
TREMOR: TREMOR NOT VISIBLE BUT CAN BE FELT, FINGERTIP TO FINGERTIP
AUDITORY DISTURBANCES: NOT PRESENT
TOTAL SCORE: 1
ORIENTATION AND CLOUDING OF SENSORIUM: ORIENTED AND CAN DO SERIAL ADDITIONS
AGITATION: NORMAL ACTIVITY
ANXIETY: *
AUDITORY DISTURBANCES: NOT PRESENT
TREMOR: TREMOR NOT VISIBLE BUT CAN BE FELT, FINGERTIP TO FINGERTIP
ORIENTATION AND CLOUDING OF SENSORIUM: ORIENTED AND CAN DO SERIAL ADDITIONS
NAUSEA AND VOMITING: NO NAUSEA AND NO VOMITING
HEADACHE, FULLNESS IN HEAD: MILD
AGITATION: NORMAL ACTIVITY
NAUSEA AND VOMITING: NO NAUSEA AND NO VOMITING
TOTAL SCORE: 4
TREMOR: *
VISUAL DISTURBANCES: NOT PRESENT
TREMOR: TREMOR NOT VISIBLE BUT CAN BE FELT, FINGERTIP TO FINGERTIP
AGITATION: NORMAL ACTIVITY
AGITATION: *
ORIENTATION AND CLOUDING OF SENSORIUM: ORIENTED AND CAN DO SERIAL ADDITIONS
ANXIETY: *
PAROXYSMAL SWEATS: BARELY PERCEPTIBLE SWEATING. PALMS MOIST
HEADACHE, FULLNESS IN HEAD: NOT PRESENT
NAUSEA AND VOMITING: NO NAUSEA AND NO VOMITING
TOTAL SCORE: 3
NAUSEA AND VOMITING: MILD NAUSEA WITH NO VOMITING
VISUAL DISTURBANCES: NOT PRESENT
ORIENTATION AND CLOUDING OF SENSORIUM: ORIENTED AND CAN DO SERIAL ADDITIONS
TREMOR: NO TREMOR

## 2021-07-20 ASSESSMENT — PATIENT HEALTH QUESTIONNAIRE - PHQ9
SUM OF ALL RESPONSES TO PHQ9 QUESTIONS 1 AND 2: 0
2. FEELING DOWN, DEPRESSED, IRRITABLE, OR HOPELESS: NOT AT ALL
1. LITTLE INTEREST OR PLEASURE IN DOING THINGS: NOT AT ALL

## 2021-07-20 ASSESSMENT — ENCOUNTER SYMPTOMS
NAUSEA: 0
CONSTIPATION: 0
HEADACHES: 0
ABDOMINAL PAIN: 0
DIARRHEA: 0
BACK PAIN: 0
VOMITING: 0
SHORTNESS OF BREATH: 0
DIZZINESS: 0
FEVER: 0
TREMORS: 1
COUGH: 0
CHILLS: 0
PALPITATIONS: 0
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
## 2021-07-20 NOTE — PROGRESS NOTES
Pt is restless and anxious and vomited few times. Ativan 1mg IV given per CIWA=10. Zofran given for N/V. Complete bed changed. Up to the BR voided and have BM. Needs attended. Aishwarya @ bedside.

## 2021-07-20 NOTE — PROGRESS NOTES
[facility] Medicine Daily Progress Note    Date of Service  7/20/2021    Chief Complaint  Lexie Rush is a 26 y.o. female admitted 7/18/2021 with Tylenol overdose    Hospital Course  26-year-old female with a past medical history of polysubstance use disorder including methamphetamines, heroin, alcohol admitted on 7/18/2021 with Tylenol overdose, took 40+ tablets, level 313 then 328 on admission.  Patient was positive on UDS for methamphetamines, opioids/heroin, alcohol level elevated 183.8.  On admission patient received N-acetylcysteine, detox bag and placed on CIWA protocol.  Poison control was called.  Pregnancy test was negative. Patient did cut her wrist 3 days prior to admission, as per HPI.  Involuntary hold paperwork started in ED.    Interval Problem Update  7/20-patient seen and evaluated with nurse Purnima meza.  Patient denied any suicidal ideations.  Patient reported using all of these drugs together.  Patient stated she does use opioids and meth to counteract side effects.  Patient also drinks when she admitted.  Psychiatry has evaluated patient yesterday but she was too lethargic.  Patient's acetaminophen level undetectable at this time. NAC can finish last dose.  Patient currently on a CIWA protocol and is likely showing side effects from her multiple drug use.    I have personally seen and examined the patient at bedside. I discussed the plan of care with patient, bedside RN, charge RN,  and pharmacy.    Consultants/Specialty  psychiatry and Poison Control    Code Status  Full Code    Disposition  Patient is not medically cleared.   Anticipate discharge to TBD.  I have placed the appropriate orders for post-discharge needs.    Review of Systems  Review of Systems   Constitutional: Positive for malaise/fatigue. Negative for chills and fever.   Respiratory: Negative for cough and shortness of breath.    Cardiovascular: Negative for chest pain and palpitations.   Gastrointestinal:  Negative for abdominal pain, constipation, diarrhea, nausea and vomiting.   Musculoskeletal: Negative for back pain and joint pain.   Neurological: Positive for tremors. Negative for dizziness and headaches.   All other systems reviewed and are negative.       Physical Exam  Temp:  [36 °C (96.8 °F)-36.9 °C (98.4 °F)] 36 °C (96.8 °F)  Pulse:  [] 118  Resp:  [17-18] 18  BP: (108-137)/(67-95) 120/75  SpO2:  [97 %-100 %] 98 %    Physical Exam  Vitals and nursing note reviewed.   Constitutional:       General: She is not in acute distress.     Appearance: Normal appearance. She is not ill-appearing.   HENT:      Head: Normocephalic and atraumatic.   Eyes:      General: No scleral icterus.     Extraocular Movements: Extraocular movements intact.   Cardiovascular:      Rate and Rhythm: Regular rhythm. Tachycardia present.      Pulses: Normal pulses.      Heart sounds: Normal heart sounds.   Pulmonary:      Effort: Pulmonary effort is normal.      Breath sounds: Normal breath sounds.   Abdominal:      General: Abdomen is flat. Bowel sounds are normal.      Palpations: Abdomen is soft.   Musculoskeletal:         General: No swelling or tenderness. Normal range of motion.      Cervical back: Normal range of motion and neck supple.   Skin:     General: Skin is warm.      Capillary Refill: Capillary refill takes less than 2 seconds.      Coloration: Skin is not jaundiced.      Findings: No erythema.   Neurological:      General: No focal deficit present.      Mental Status: She is alert and oriented to person, place, and time. Mental status is at baseline.      Motor: No weakness.   Psychiatric:         Mood and Affect: Mood normal.         Behavior: Behavior normal.         Thought Content: Thought content normal.         Judgment: Judgment normal.      Comments: Patient denies any suicidal or homicidal ideation  Patient was in regret for consuming Tylenol while she was drinking         Fluids    Intake/Output Summary  (Last 24 hours) at 7/20/2021 1558  Last data filed at 7/19/2021 2225  Gross per 24 hour   Intake 480 ml   Output --   Net 480 ml       Laboratory  Recent Labs     07/18/21 1824 07/19/21 0325 07/20/21  0543   WBC 8.3 6.3 7.5   RBC 4.71 4.36 4.33   HEMOGLOBIN 14.4 13.3 13.2   HEMATOCRIT 42.8 39.5 39.1   MCV 90.9 90.6 90.3   MCH 30.6 30.5 30.5   MCHC 33.6 33.7 33.8   RDW 43.8 43.5 43.9   PLATELETCT 298 272 253   MPV 9.7 9.6 9.7     Recent Labs     07/19/21 0325 07/19/21  0622 07/20/21  0543   SODIUM 133* 133* 135   POTASSIUM 3.6 3.2* 4.2   CHLORIDE 101 101 105   CO2 17* 19* 19*   GLUCOSE 130* 135* 122*   BUN 8 8 5*   CREATININE 0.57 0.61 0.59   CALCIUM 8.0* 8.1* 8.8     Recent Labs     07/18/21 1824 07/19/21  0622 07/20/21  0543   APTT 26.7  --   --    INR 0.92 1.22* 1.09               Imaging  No orders to display        Assessment/Plan  * Tylenol overdose, intentional self-harm, initial encounter (HCC)- (present on admission)  Assessment & Plan  Reportedly took 40 tablets of Tylenol at 3 PM.  Tylenol level 313 --> 64  Continue NAC per protocol  Legal hold   Suicidal precaution, fall, aspiration, seizure precautions  Psychiatry consult, attempted to see patient but she was too allergic, pending completed evaluation  Q12 hr labs and NAC until tylenol level undetectable per poison control       Tobacco abuse- (present on admission)  Assessment & Plan  Tobacco cessation counseling and education provided for 4 minutes. Nicotine replacement options provided including patch, and further medical treatments including Wellbutrin and Chantix. As well as over the counter options of lozenges and gum.      Polysubstance abuse (HCC)- (present on admission)  Assessment & Plan  UDS positive for methamphetamine, opioids  We will plan to provide with illicit drug counseling when alcohol intoxication resolves.   7/20-patient counseled with nurse present in the room about illicit drug use cessation    Hypokalemia- (present on  admission)  Assessment & Plan  Monitor and replace    Alcohol intoxication (HCC)- (present on admission)  Assessment & Plan  Supportive care  Monitor for withdrawal  Continue CIWA protocol     VTE prophylaxis: enoxaparin ppx    I have performed a physical exam and reviewed and updated ROS and Plan today (7/20/2021). In review of yesterday's note (7/19/2021), there are no changes except as documented above.

## 2021-07-20 NOTE — WOUND TEAM
"[facility]  Inpatient Services  Initial Wound and Skin Care Evaluation    Admission Date: 7/18/2021     Last order of IP CONSULT TO WOUND CARE was found on 7/19/2021 from Hospital Encounter on 7/18/2021     HPI, PMH, SH: Reviewed    Past Surgical History:   Procedure Laterality Date   • PELVISCOPY  4/7/2014    Performed by Brendon Grove M.D. at SURGERY [facility]     Social History     Tobacco Use   • Smoking status: Current Every Day Smoker     Types: Cigarettes   • Smokeless tobacco: Never Used   • Tobacco comment: smoke e cigarettes   Substance Use Topics   • Alcohol use: Yes     Comment: 1/mo      Chief Complaint   Patient presents with   • Drug Overdose     per EMS, patient took 40 pills of tylenol. States, daughter with father at the moment. states \"i'm a bad mom\"     Diagnosis: Suicidal deliberate poisoning, initial encounter ([facility]) [T65.92XA]    Unit where seen by Wound Team: S150/00     WOUND CONSULT/FOLLOW UP RELATED TO:  Left wrist     WOUND HISTORY:  Lexie Rush is a 26 y.o. female with past medical history of polysubstance abuse, depression, Henoch-Schönlein purpura, who presented 7/18/2021 with drug overdose.  Patient is sleeping and difficult to arouse for conversation.  She is moaning yes or no only, therefore history is mostly obtained from medical chart.  Patient has long history of alcohol, methamphetamine and heroin abuse by smoking, reportedly denied injections.  She has been depressed, and cut her wrists 3 days ago and bandaged herself.  Today she was feeling especially depressed and took 40 tablets of Tylenol at 3 PM.  Evaluation in ER significant for acetaminophen levels 313, diagnostic alcohol 183, UDS positive for opiates and amphetamine.  Poison control contacted and patient was started on NAC per protocol  WOUND ASSESSMENT/LDA  Wound 07/19/21 Wrist Left (Active)   Wound Image    07/19/21 1700   Site Assessment Red;[place] 07/19/21 1700   Periwound Assessment [place] " 07/19/21 1700   Margins Defined edges;Unattached edges 07/19/21 1700   Closure Secondary intention 07/19/21 1700   Drainage Amount None 07/19/21 1700   Treatments Cleansed;Site care 07/19/21 1700   Wound Cleansing Approved Wound Cleanser 07/19/21 1700   Periwound Protectant Skin Protectant Wipes to Periwound 07/19/21 1700   Dressing Cleansing/Solutions Not Applicable 07/19/21 1700   Dressing Options Collagen Dressing;Hydrofera Blue Ready;Hypafix Tape 07/19/21 1700   Dressing Changed New 07/19/21 1700   Dressing Status Clean;Dry;Intact 07/19/21 1700   Dressing Change/Treatment Frequency Every 72 hrs, and As Needed 07/19/21 1700   NEXT Dressing Change/Treatment Date 07/22/21 07/19/21 1700   NEXT Weekly Photo (Inpatient Only) 07/26/21 07/19/21 1700   Non-staged Wound Description Full thickness 07/19/21 1700   Wound Length (cm) 1 cm 07/19/21 1700   Wound Width (cm) 5.5 cm 07/19/21 1700   Wound Depth (cm) 0.8 cm 07/19/21 1700   Wound Surface Area (cm^2) 5.5 cm^2 07/19/21 1700   Wound Volume (cm^3) 4.4 cm^3 07/19/21 1700   Shape linear 07/19/21 1700   Wound Odor None 07/19/21 1700   Pulses N/A 07/19/21 1700   Exposed Structures None 07/19/21 1700   WOUND NURSE ONLY - Time Spent with Patient (mins) 45 07/19/21 1700   Number of days: 0        Vascular:    STEFAN:   No results found.    Lab Values:    Lab Results   Component Value Date/Time    WBC 6.3 07/19/2021 03:25 AM    RBC 4.36 07/19/2021 03:25 AM    HEMOGLOBIN 13.3 07/19/2021 03:25 AM    HEMATOCRIT 39.5 07/19/2021 03:25 AM        Culture Results show:  No results found for this or any previous visit (from the past 720 hour(s)).    Pain Level/Medicated:  Patient tolerated without medication       INTERVENTIONS BY WOUND TEAM:  Chart and images reviewed. Discussed with bedside RN. All areas of concern (based on picture review, LDA review and discussion with bedside RN) have been thoroughly assessed. Documentation of areas based on significant findings. This RN in to assess  patient. Performed standard wound care which includes appropriate positioning, dressing removal and non-selective debridement. Pictures and measurements obtained weekly if/when required.  Preparation for Dressing removal: Dressing soaked with n/a  Cleansed with:  wound cleanser and gauze.  Sharp debridement: n/a  [place] wound: Cleansed with wound cleanser, Prepped with no sting  Primary Dressing: kenia, hydrofera blue  Secondary (Outer) Dressing: hypafix    Interdisciplinary consultation: Patient, Bedside RN ,     EVALUATION / RATIONALE FOR TREATMENT:  Most Recent Date:  7/19/21: wound is fairly deep, but clean. Pink and red tissue.       Goals: Steady decrease in wound area and depth weekly.    WOUND TEAM PLAN OF CARE ([X] for frequency of wound follow up,): X  Nursing to follow orders written for wound care. Contact wound team if area fails to progress, deteriorates or with any questions/concerns  Dressing changes by wound team:                   Follow up 3 times weekly:                NPWT change 3 times weekly:     Follow up 1-2 times weekly:      Follow up Bi-Monthly:                   Follow up as needed:  X   Other (explain):     NURSING PLAN OF CARE ORDERS (X):  Dressing changes: See Dressing Care orders: X  Skin care: See Skin Care orders: X  RN Prevention Protocol: X  Rectal tube care: See Rectal Tube Care orders:   Other orders:    RSKIN:   CURRENTLY IN PLACE (X), APPLIED THIS VISIT (A), ORDERED (O):   Q shift Sim:  X  Q shift pressure point assessments:  X    Surface/Positioning X  Pressure redistribution mattress    X        Low Airloss          Bariatric foam      Bariatric CUATE     Waffle cushion        Waffle Overlay          Reposition q 2 hours      TAPs Turning system     Z Raymundo Pillow     Offloading/Redistribution X  Sacral Mepilex (Silicone dressing)     Heel Mepilex (Silicone dressing)         Heel float boots (Prevalon boot)             Float Heels off Bed with Pillows           Respiratory  X  Silicone O2 tubing         Gray Foam Ear protectors     Cannula fixation Device (Tender )          High flow offloading Clip    Elastic head band offloading device      Anchorfast                                                         Trach with Optifoam split foam             Containment/Moisture Prevention X    Rectal tube or BMS    Purwick/Condom Cath        Cloud Catheter    Barrier wipes           Barrier paste       Antifungal tx      Interdry        Mobilization X      Up to chair        Ambulate      PT/OT      Nutrition PO      Dietician        Diabetes Education      PO     TF     TPN     NPO   # days     Other        Anticipated discharge plans: No advanced wound care needed at this time  LTACH:        SNF/Rehab:                  Home Health Care:           Outpatient Wound Center:            Self/Family Care:        Other:

## 2021-07-20 NOTE — PROGRESS NOTES
Received on her room awake, with sitter @ bedside, slightly anxious but pleasant. POC updated. Needs attended.

## 2021-07-20 NOTE — CARE PLAN
The patient is Stable - Low risk of patient condition declining or worsening    Shift Goals  Clinical Goals: rest  Patient Goals: safety and comfort    Progress made toward(s) clinical / shift goals:  Pt is resting comfortably      Problem: Provide Safe Environment  Goal: Suicide environmental safety, protocols, policies, and practices will be implemented  Outcome: Progressing     Problem: Psychosocial  Goal: Patient's level of anxiety will decrease  Outcome: Progressing

## 2021-07-20 NOTE — PROGRESS NOTES
"Patient reporting symptoms of opioid withdrawal, requesting suboxone. Notified provider of patients request. When suboxone was not ordered at this time patient became very agitated and expressed wishes to leave. This RN educated patient on basis of legal hold. Patient then asked \"for a pain pill for my arm or something to help out\" Despite not reporting any pain there throughout shift. Patient medicated per the CIWA scale.  "

## 2021-07-20 NOTE — CARE PLAN
The patient is Stable - Low risk of patient condition declining or worsening    Shift Goals  Clinical Goals: safety  Patient Goals: rest    Progress made toward(s) clinical / shift goals:    Problem: Knowledge Deficit - Standard  Goal: Patient and family/care givers will demonstrate understanding of plan of care, disease process/condition, diagnostic tests and medications  Outcome: Progressing     Problem: Provide Safe Environment  Goal: Suicide environmental safety, protocols, policies, and practices will be implemented  Outcome: Progressing  Flowsheets (Taken 7/20/2021 0800)  Safety Interventions:   Patient Wearing Hospital Clothing   Personal Clothing / Belongings Removed (Comment: Storage Location)   Potentially Dangerous Items Removed from room   Plastic Utensils / Paper Ware Ordered   Provided Safety Education   Discussed no self harm or elopement and safe behavior with patient   Patient Accompanied by Unit Staff when off Unit.   Medically necessary equipment present, hourly room safety check, and post-meal tray check.  Note: Sitter remains at bedside for safety.      Problem: Psychosocial  Goal: Patient's ability to identify and develop effective coping behaviors will improve  Outcome: Progressing  Goal: Patient's ability to identify and utilize available support systems will improve  Outcome: Progressing     Problem: Optimal Care for Alcohol Withdrawal  Goal: Optimal Care for the alcohol withdrawal patient  Outcome: Progressing  Note: CIWA protocol in place.  Patient medicated PRN per MAR.      Problem: Psychosocial  Goal: Patient's level of anxiety will decrease  Outcome: Progressing       Patient is not progressing towards the following goals:

## 2021-07-21 LAB
ALBUMIN SERPL BCP-MCNC: 4.1 G/DL (ref 3.2–4.9)
ALBUMIN/GLOB SERPL: 1.8 G/DL
ALP SERPL-CCNC: 53 U/L (ref 30–99)
ALT SERPL-CCNC: 10 U/L (ref 2–50)
ANION GAP SERPL CALC-SCNC: 10 MMOL/L (ref 7–16)
APAP SERPL-MCNC: <5 UG/ML (ref 10–30)
APAP SERPL-MCNC: <5 UG/ML (ref 10–30)
AST SERPL-CCNC: 13 U/L (ref 12–45)
BILIRUB SERPL-MCNC: 0.2 MG/DL (ref 0.1–1.5)
BUN SERPL-MCNC: 7 MG/DL (ref 8–22)
CALCIUM SERPL-MCNC: 8.8 MG/DL (ref 8.5–10.5)
CHLORIDE SERPL-SCNC: 105 MMOL/L (ref 96–112)
CO2 SERPL-SCNC: 22 MMOL/L (ref 20–33)
CREAT SERPL-MCNC: 0.59 MG/DL (ref 0.5–1.4)
GLOBULIN SER CALC-MCNC: 2.3 G/DL (ref 1.9–3.5)
GLUCOSE SERPL-MCNC: 99 MG/DL (ref 65–99)
INR PPP: 0.99 (ref 0.87–1.13)
MAGNESIUM SERPL-MCNC: 1.9 MG/DL (ref 1.5–2.5)
PHOSPHATE SERPL-MCNC: 2.7 MG/DL (ref 2.5–4.5)
POTASSIUM SERPL-SCNC: 4.1 MMOL/L (ref 3.6–5.5)
PROT SERPL-MCNC: 6.4 G/DL (ref 6–8.2)
PROTHROMBIN TIME: 12.8 SEC (ref 12–14.6)
SODIUM SERPL-SCNC: 137 MMOL/L (ref 135–145)

## 2021-07-21 PROCEDURE — A9270 NON-COVERED ITEM OR SERVICE: HCPCS | Performed by: STUDENT IN AN ORGANIZED HEALTH CARE EDUCATION/TRAINING PROGRAM

## 2021-07-21 PROCEDURE — 770020 HCHG ROOM/CARE - TELE (206)

## 2021-07-21 PROCEDURE — 700111 HCHG RX REV CODE 636 W/ 250 OVERRIDE (IP): Performed by: INTERNAL MEDICINE

## 2021-07-21 PROCEDURE — 83735 ASSAY OF MAGNESIUM: CPT

## 2021-07-21 PROCEDURE — 700102 HCHG RX REV CODE 250 W/ 637 OVERRIDE(OP): Performed by: INTERNAL MEDICINE

## 2021-07-21 PROCEDURE — A9270 NON-COVERED ITEM OR SERVICE: HCPCS | Performed by: INTERNAL MEDICINE

## 2021-07-21 PROCEDURE — 80053 COMPREHEN METABOLIC PANEL: CPT

## 2021-07-21 PROCEDURE — 80143 DRUG ASSAY ACETAMINOPHEN: CPT | Mod: 91

## 2021-07-21 PROCEDURE — 84100 ASSAY OF PHOSPHORUS: CPT

## 2021-07-21 PROCEDURE — 700102 HCHG RX REV CODE 250 W/ 637 OVERRIDE(OP): Performed by: STUDENT IN AN ORGANIZED HEALTH CARE EDUCATION/TRAINING PROGRAM

## 2021-07-21 PROCEDURE — 36415 COLL VENOUS BLD VENIPUNCTURE: CPT

## 2021-07-21 PROCEDURE — 99233 SBSQ HOSP IP/OBS HIGH 50: CPT | Performed by: INTERNAL MEDICINE

## 2021-07-21 PROCEDURE — 700101 HCHG RX REV CODE 250: Performed by: INTERNAL MEDICINE

## 2021-07-21 PROCEDURE — 85610 PROTHROMBIN TIME: CPT

## 2021-07-21 PROCEDURE — 700111 HCHG RX REV CODE 636 W/ 250 OVERRIDE (IP): Performed by: STUDENT IN AN ORGANIZED HEALTH CARE EDUCATION/TRAINING PROGRAM

## 2021-07-21 RX ORDER — CHOLECALCIFEROL (VITAMIN D3) 125 MCG
5 CAPSULE ORAL NIGHTLY PRN
Status: DISCONTINUED | OUTPATIENT
Start: 2021-07-21 | End: 2021-07-27 | Stop reason: HOSPADM

## 2021-07-21 RX ADMIN — HALOPERIDOL LACTATE 5 MG: 5 INJECTION, SOLUTION INTRAMUSCULAR at 22:02

## 2021-07-21 RX ADMIN — THERA TABS 1 TABLET: TAB at 05:25

## 2021-07-21 RX ADMIN — NICOTINE 14 MG: 14 PATCH TRANSDERMAL at 05:24

## 2021-07-21 RX ADMIN — LORAZEPAM 0.5 MG: 1 TABLET ORAL at 17:00

## 2021-07-21 RX ADMIN — POTASSIUM CHLORIDE AND SODIUM CHLORIDE: 900; 300 INJECTION, SOLUTION INTRAVENOUS at 03:37

## 2021-07-21 RX ADMIN — Medication 100 MG: at 05:24

## 2021-07-21 RX ADMIN — NICOTINE POLACRILEX 2 MG: 2 GUM, CHEWING BUCCAL at 18:11

## 2021-07-21 RX ADMIN — FAMOTIDINE 20 MG: 20 TABLET ORAL at 16:55

## 2021-07-21 RX ADMIN — FAMOTIDINE 20 MG: 20 TABLET ORAL at 05:25

## 2021-07-21 RX ADMIN — LORAZEPAM 0.5 MG: 2 INJECTION INTRAMUSCULAR; INTRAVENOUS at 05:42

## 2021-07-21 RX ADMIN — FOLIC ACID 1 MG: 1 TABLET ORAL at 05:24

## 2021-07-21 RX ADMIN — ENOXAPARIN SODIUM 40 MG: 40 INJECTION SUBCUTANEOUS at 05:25

## 2021-07-21 RX ADMIN — Medication 5 MG: at 22:02

## 2021-07-21 ASSESSMENT — ENCOUNTER SYMPTOMS
CHILLS: 0
BACK PAIN: 0
VOMITING: 0
PALPITATIONS: 0
FEVER: 0
SHORTNESS OF BREATH: 0
TREMORS: 1
ABDOMINAL PAIN: 0
NAUSEA: 0
DIARRHEA: 0
DIZZINESS: 0
HEADACHES: 0
CONSTIPATION: 0
COUGH: 0

## 2021-07-21 ASSESSMENT — LIFESTYLE VARIABLES
TOTAL SCORE: 3
TOTAL SCORE: 7
PAROXYSMAL SWEATS: BARELY PERCEPTIBLE SWEATING. PALMS MOIST
NAUSEA AND VOMITING: NO NAUSEA AND NO VOMITING
PAROXYSMAL SWEATS: NO SWEAT VISIBLE
HEADACHE, FULLNESS IN HEAD: VERY MILD
ORIENTATION AND CLOUDING OF SENSORIUM: ORIENTED AND CAN DO SERIAL ADDITIONS
HEADACHE, FULLNESS IN HEAD: VERY MILD
ANXIETY: MILDLY ANXIOUS
NAUSEA AND VOMITING: NO NAUSEA AND NO VOMITING
TREMOR: TREMOR NOT VISIBLE BUT CAN BE FELT, FINGERTIP TO FINGERTIP
AUDITORY DISTURBANCES: NOT PRESENT
AGITATION: NORMAL ACTIVITY
VISUAL DISTURBANCES: NOT PRESENT
TREMOR: TREMOR NOT VISIBLE BUT CAN BE FELT, FINGERTIP TO FINGERTIP
NAUSEA AND VOMITING: NO NAUSEA AND NO VOMITING
VISUAL DISTURBANCES: NOT PRESENT
AUDITORY DISTURBANCES: NOT PRESENT
AUDITORY DISTURBANCES: NOT PRESENT
VISUAL DISTURBANCES: NOT PRESENT
TOTAL SCORE: 2
TREMOR: TREMOR NOT VISIBLE BUT CAN BE FELT, FINGERTIP TO FINGERTIP
HEADACHE, FULLNESS IN HEAD: MILD
ANXIETY: *
AGITATION: NORMAL ACTIVITY
PAROXYSMAL SWEATS: BARELY PERCEPTIBLE SWEATING. PALMS MOIST
ORIENTATION AND CLOUDING OF SENSORIUM: ORIENTED AND CAN DO SERIAL ADDITIONS
ANXIETY: NO ANXIETY (AT EASE)
ORIENTATION AND CLOUDING OF SENSORIUM: ORIENTED AND CAN DO SERIAL ADDITIONS
AGITATION: SOMEWHAT MORE THAN NORMAL ACTIVITY
AGITATION: SOMEWHAT MORE THAN NORMAL ACTIVITY
TREMOR: TREMOR NOT VISIBLE BUT CAN BE FELT, FINGERTIP TO FINGERTIP
TOTAL SCORE: 3
TOTAL SCORE: 2
AUDITORY DISTURBANCES: NOT PRESENT
ANXIETY: MILDLY ANXIOUS
HEADACHE, FULLNESS IN HEAD: NOT PRESENT
PAROXYSMAL SWEATS: NO SWEAT VISIBLE
AGITATION: NORMAL ACTIVITY
VISUAL DISTURBANCES: VERY MILD SENSITIVITY
TOTAL SCORE: 7
ORIENTATION AND CLOUDING OF SENSORIUM: ORIENTED AND CAN DO SERIAL ADDITIONS
NAUSEA AND VOMITING: NO NAUSEA AND NO VOMITING
HEADACHE, FULLNESS IN HEAD: NOT PRESENT
TREMOR: TREMOR NOT VISIBLE BUT CAN BE FELT, FINGERTIP TO FINGERTIP
ORIENTATION AND CLOUDING OF SENSORIUM: ORIENTED AND CAN DO SERIAL ADDITIONS
ANXIETY: MILDLY ANXIOUS
HEADACHE, FULLNESS IN HEAD: VERY MILD
TREMOR: TREMOR NOT VISIBLE BUT CAN BE FELT, FINGERTIP TO FINGERTIP
VISUAL DISTURBANCES: NOT PRESENT
NAUSEA AND VOMITING: NO NAUSEA AND NO VOMITING
ORIENTATION AND CLOUDING OF SENSORIUM: ORIENTED AND CAN DO SERIAL ADDITIONS
ANXIETY: MILDLY ANXIOUS
NAUSEA AND VOMITING: MILD NAUSEA WITH NO VOMITING
PAROXYSMAL SWEATS: BARELY PERCEPTIBLE SWEATING. PALMS MOIST
AUDITORY DISTURBANCES: NOT PRESENT
VISUAL DISTURBANCES: NOT PRESENT
AGITATION: NORMAL ACTIVITY
PAROXYSMAL SWEATS: NO SWEAT VISIBLE
AUDITORY DISTURBANCES: NOT PRESENT

## 2021-07-21 NOTE — CARE PLAN
Problem: Knowledge Deficit - Standard  Goal: Patient and family/care givers will demonstrate understanding of plan of care, disease process/condition, diagnostic tests and medications  Outcome: Progressing   Pt is update with plan of care and agreeable at this time  Problem: Provide Safe Environment  Goal: Suicide environmental safety, protocols, policies, and practices will be implemented  Outcome: Progressing  Room has been stripped of any harmful opportunities.      The patient is Stable - Low risk of patient condition declining or worsening    Shift Goals  Clinical Goals: safety and decrease in CIWA rating  Patient Goals: rest  Family Goals: safety and comfort    Progress made toward(s) clinical / shift goals:  progressing    Patient is not progressing towards the following goals:

## 2021-07-21 NOTE — CARE PLAN
Problem: Knowledge Deficit - Standard  Goal: Patient and family/care givers will demonstrate understanding of plan of care, disease process/condition, diagnostic tests and medications  Outcome: Progressing     Problem: Provide Safe Environment  Goal: Suicide environmental safety, protocols, policies, and practices will be implemented  Outcome: Progressing     Problem: Psychosocial  Goal: Patient's ability to identify and develop effective coping behaviors will improve  Outcome: Progressing  Goal: Patient's ability to identify and utilize available support systems will improve  Outcome: Progressing     Problem: Optimal Care for Alcohol Withdrawal  Goal: Optimal Care for the alcohol withdrawal patient  Outcome: Progressing     Problem: Seizure Precautions  Goal: Implementation of seizure precautions  Outcome: Progressing     Problem: Lifestyle Changes  Goal: Patient's ability to identify lifestyle changes and available resources to help reduce recurrence of condition will improve  Outcome: Progressing     Problem: Psychosocial  Goal: Patient's level of anxiety will decrease  Outcome: Progressing  Goal: Spiritual and cultural needs incorporated into hospitalization  Outcome: Progressing     Problem: Risk for Aspiration  Goal: Patient's risk for aspiration will be absent or decrease  Outcome: Progressing     Problem: Pain - Standard  Goal: Alleviation of pain or a reduction in pain to the patient’s comfort goal  Outcome: Progressing   The patient is Stable - Low risk of patient condition declining or worsening    Shift Goals  Clinical Goals: safety   Patient Goals: rest  Family Goals: comfort and rest    Progress made toward(s) clinical / shift goals: patient slept all night CIWA was 2-7, prn Ativan given x1, sitter remains at bedside no issues last night    Patient is not progressing towards the following goals:

## 2021-07-21 NOTE — DISCHARGE PLANNING
Filed petition to the court via [employer]. Waiting on verified petition.    Received verified petition from the court. Scanned copy of legal hold extension into pt's chart, sent copy to LIU Gardner.

## 2021-07-21 NOTE — PROGRESS NOTES
[facility] Medicine Daily Progress Note    Date of Service  7/21/2021    Chief Complaint  Lexie Rush is a 26 y.o. female admitted 7/18/2021 with Tylenol overdose    Hospital Course  26-year-old female with a past medical history of polysubstance use disorder including methamphetamines, heroin, alcohol admitted on 7/18/2021 with Tylenol overdose, took 40+ tablets, level 313 then 328 on admission.  Patient was positive on UDS for methamphetamines, opioids/heroin, alcohol level elevated 183.8.  On admission patient received N-acetylcysteine, detox bag and placed on CIWA protocol.  Poison control was called.  Pregnancy test was negative. Patient did cut her wrist 3 days prior to admission, as per HPI.  Involuntary hold paperwork started in ED.    Interval Problem Update  7/20-patient seen and evaluated with nurse Purnima meza.  Patient denied any suicidal ideations.  Patient reported using all of these drugs together.  Patient stated she does use opioids and meth to counteract side effects.  Patient also drinks when she admitted.  Psychiatry has evaluated patient yesterday but she was too lethargic.  Patient's acetaminophen level undetectable at this time. NAC can finish last dose.  Patient currently on a CIWA protocol and is likely showing side effects from her multiple drug use.    7/21-patient seen and evaluated with sitter present.  Patient stated she was doing better but noticed that she is having increased tremors.  CIWA elevated to 11/20 4 hours.  Patient has required Ativan.  Psychiatry services has evaluated patient and deemed she will need inpatient psychiatric admission after she is medically cleared.  Patient has completed NAC treatment.  Liver function normalized.  Patient continued on extended legal hold.    I have personally seen and examined the patient at bedside. I discussed the plan of care with patient, bedside RN, charge RN,  and pharmacy.    Consultants/Specialty  psychiatry and  Poison Control    Code Status  Full Code    Disposition  Patient is not medically cleared.   Anticipate discharge to TBD.  I have placed the appropriate orders for post-discharge needs.    Review of Systems  Review of Systems   Constitutional: Positive for malaise/fatigue. Negative for chills and fever.   Respiratory: Negative for cough and shortness of breath.    Cardiovascular: Negative for chest pain and palpitations.   Gastrointestinal: Negative for abdominal pain, constipation, diarrhea, nausea and vomiting.   Musculoskeletal: Negative for back pain and joint pain.   Neurological: Positive for tremors. Negative for dizziness and headaches.   All other systems reviewed and are negative.       Physical Exam  Temp:  [36.3 °C (97.3 °F)-36.8 °C (98.2 °F)] 36.7 °C (98 °F)  Pulse:  [70-98] 98  Resp:  [17-18] 17  BP: (111-132)/(69-88) 111/69  SpO2:  [96 %-99 %] 96 %    Physical Exam  Vitals and nursing note reviewed.   Constitutional:       General: She is not in acute distress.     Appearance: Normal appearance. She is not ill-appearing.   HENT:      Head: Normocephalic and atraumatic.   Eyes:      General: No scleral icterus.     Extraocular Movements: Extraocular movements intact.   Cardiovascular:      Rate and Rhythm: Regular rhythm. Tachycardia present.      Pulses: Normal pulses.      Heart sounds: Normal heart sounds.   Pulmonary:      Effort: Pulmonary effort is normal.      Breath sounds: Normal breath sounds.   Abdominal:      General: Abdomen is flat. Bowel sounds are normal.      Palpations: Abdomen is soft.   Musculoskeletal:         General: No swelling or tenderness. Normal range of motion.      Cervical back: Normal range of motion and neck supple.   Skin:     General: Skin is warm.      Capillary Refill: Capillary refill takes less than 2 seconds.      Coloration: Skin is not jaundiced.      Findings: No erythema.   Neurological:      General: No focal deficit present.      Mental Status: She is alert  and oriented to person, place, and time. Mental status is at baseline.      Motor: Tremor present. No weakness.   Psychiatric:         Mood and Affect: Mood normal.         Behavior: Behavior normal.         Thought Content: Thought content normal.         Judgment: Judgment normal.      Comments: Patient denies any suicidal or homicidal ideation  Patient was in regret for consuming Tylenol while she was drinking         Fluids  No intake or output data in the 24 hours ending 07/21/21 1617    Laboratory  Recent Labs     07/18/21  1824 07/19/21  0325 07/20/21  0543   WBC 8.3 6.3 7.5   RBC 4.71 4.36 4.33   HEMOGLOBIN 14.4 13.3 13.2   HEMATOCRIT 42.8 39.5 39.1   MCV 90.9 90.6 90.3   MCH 30.6 30.5 30.5   MCHC 33.6 33.7 33.8   RDW 43.8 43.5 43.9   PLATELETCT 298 272 253   MPV 9.7 9.6 9.7     Recent Labs     07/19/21 0622 07/20/21  0543 07/21/21  0627   SODIUM 133* 135 137   POTASSIUM 3.2* 4.2 4.1   CHLORIDE 101 105 105   CO2 19* 19* 22   GLUCOSE 135* 122* 99   BUN 8 5* 7*   CREATININE 0.61 0.59 0.59   CALCIUM 8.1* 8.8 8.8     Recent Labs     07/18/21 1824 07/18/21 1824 07/19/21  0622 07/20/21  0543 07/21/21  0627   APTT 26.7  --   --   --   --    INR 0.92   < > 1.22* 1.09 0.99    < > = values in this interval not displayed.               Imaging  No orders to display   No imaging ordered on this admission at this time.    Assessment/Plan  * Tylenol overdose, intentional self-harm, initial encounter ([facility])- (present on admission)  Assessment & Plan  Reportedly took 40 tablets of Tylenol at 3 PM.  Tylenol level 313 --> 64  Continue NAC per protocol  Legal hold   Suicidal precaution, fall, aspiration, seizure precautions  Psychiatry consult, attempted to see patient but she was too allergic, pending completed evaluation  Q12 hr labs and NAC until tylenol level undetectable per poison control   Improved, LFTs improved  As per psychiatric services, patient will need inpatient psychiatric facility admission once she is  medically cleared.  At this time patient is not medically cleared, currently undergoing withdrawal from her multiple illicit drugs.    Tobacco abuse- (present on admission)  Assessment & Plan  Tobacco cessation counseling and education provided for 4 minutes. Nicotine replacement options provided including patch, and further medical treatments including Wellbutrin and Chantix. As well as over the counter options of lozenges and gum.    Polysubstance abuse (HCC)- (present on admission)  Assessment & Plan  UDS positive for methamphetamine, opioids  We will plan to provide with illicit drug counseling when alcohol intoxication resolves.   7/20-patient counseled with nurse present in the room about illicit drug use cessation    Hypokalemia- (present on admission)  Assessment & Plan  Monitor and replace    Alcohol intoxication (HCC)- (present on admission)  Assessment & Plan  Supportive care  Monitor for withdrawal  Continue CIWA protocol, patient continues to require Ativan.  Patient's not medically cleared     VTE prophylaxis: enoxaparin ppx    I have performed a physical exam and reviewed and updated ROS and Plan today (7/21/2021). In review of yesterday's note (7/20/2021), there are no changes except as documented above.

## 2021-07-21 NOTE — PROGRESS NOTES
Pt was up to shower and brushed teeth. Stating she is in a better mood today which is an improvement. Dressings are intact without new drainage.    with 1-1 sitter

## 2021-07-22 PROBLEM — F10.939 ALCOHOL WITHDRAWAL SEIZURE WITH COMPLICATION (HCC): Status: ACTIVE | Noted: 2021-07-22

## 2021-07-22 PROBLEM — R56.9 ALCOHOL WITHDRAWAL SEIZURE WITH COMPLICATION (HCC): Status: ACTIVE | Noted: 2021-07-22

## 2021-07-22 LAB
ALBUMIN SERPL BCP-MCNC: 4.1 G/DL (ref 3.2–4.9)
ALBUMIN/GLOB SERPL: 1.8 G/DL
ALP SERPL-CCNC: 64 U/L (ref 30–99)
ALT SERPL-CCNC: 13 U/L (ref 2–50)
ANION GAP SERPL CALC-SCNC: 17 MMOL/L (ref 7–16)
APAP SERPL-MCNC: <5 UG/ML (ref 10–30)
APAP SERPL-MCNC: <5 UG/ML (ref 10–30)
APTT PPP: 24.7 SEC (ref 24.7–36)
AST SERPL-CCNC: 17 U/L (ref 12–45)
BASE EXCESS BLDA CALC-SCNC: -6 MMOL/L (ref -4–3)
BILIRUB SERPL-MCNC: <0.2 MG/DL (ref 0.1–1.5)
BODY TEMPERATURE: ABNORMAL CENTIGRADE
BUN SERPL-MCNC: 8 MG/DL (ref 8–22)
CALCIUM SERPL-MCNC: 8.8 MG/DL (ref 8.5–10.5)
CHLORIDE SERPL-SCNC: 101 MMOL/L (ref 96–112)
CO2 SERPL-SCNC: 17 MMOL/L (ref 20–33)
CREAT SERPL-MCNC: 0.62 MG/DL (ref 0.5–1.4)
EKG IMPRESSION: NORMAL
GLOBULIN SER CALC-MCNC: 2.3 G/DL (ref 1.9–3.5)
GLUCOSE BLD-MCNC: 113 MG/DL (ref 65–99)
GLUCOSE SERPL-MCNC: 120 MG/DL (ref 65–99)
HCO3 BLDA-SCNC: 17 MMOL/L (ref 17–25)
INR PPP: 0.96 (ref 0.87–1.13)
MAGNESIUM SERPL-MCNC: 1.9 MG/DL (ref 1.5–2.5)
PCO2 BLDA: 25.6 MMHG (ref 26–37)
PH BLDA: 7.43 [PH] (ref 7.4–7.5)
PHOSPHATE SERPL-MCNC: 2.7 MG/DL (ref 2.5–4.5)
PO2 BLDA: 83.2 MMHG (ref 64–87)
POTASSIUM SERPL-SCNC: 3.5 MMOL/L (ref 3.6–5.5)
PROT SERPL-MCNC: 6.4 G/DL (ref 6–8.2)
PROTHROMBIN TIME: 12.5 SEC (ref 12–14.6)
SAO2 % BLDA: 95.7 % (ref 93–99)
SODIUM SERPL-SCNC: 135 MMOL/L (ref 135–145)

## 2021-07-22 PROCEDURE — A9270 NON-COVERED ITEM OR SERVICE: HCPCS | Performed by: STUDENT IN AN ORGANIZED HEALTH CARE EDUCATION/TRAINING PROGRAM

## 2021-07-22 PROCEDURE — 95819 EEG AWAKE AND ASLEEP: CPT | Mod: 26 | Performed by: PSYCHIATRY & NEUROLOGY

## 2021-07-22 PROCEDURE — 93005 ELECTROCARDIOGRAM TRACING: CPT | Performed by: INTERNAL MEDICINE

## 2021-07-22 PROCEDURE — 83735 ASSAY OF MAGNESIUM: CPT

## 2021-07-22 PROCEDURE — 700111 HCHG RX REV CODE 636 W/ 250 OVERRIDE (IP): Performed by: STUDENT IN AN ORGANIZED HEALTH CARE EDUCATION/TRAINING PROGRAM

## 2021-07-22 PROCEDURE — 4A00X4Z MEASUREMENT OF CENTRAL NERVOUS ELECTRICAL ACTIVITY, EXTERNAL APPROACH: ICD-10-PCS | Performed by: PSYCHIATRY & NEUROLOGY

## 2021-07-22 PROCEDURE — 700111 HCHG RX REV CODE 636 W/ 250 OVERRIDE (IP): Performed by: INTERNAL MEDICINE

## 2021-07-22 PROCEDURE — 99223 1ST HOSP IP/OBS HIGH 75: CPT | Mod: 25 | Performed by: PSYCHIATRY & NEUROLOGY

## 2021-07-22 PROCEDURE — A9270 NON-COVERED ITEM OR SERVICE: HCPCS | Performed by: INTERNAL MEDICINE

## 2021-07-22 PROCEDURE — 82803 BLOOD GASES ANY COMBINATION: CPT

## 2021-07-22 PROCEDURE — 700102 HCHG RX REV CODE 250 W/ 637 OVERRIDE(OP): Performed by: INTERNAL MEDICINE

## 2021-07-22 PROCEDURE — 770020 HCHG ROOM/CARE - TELE (206)

## 2021-07-22 PROCEDURE — 85610 PROTHROMBIN TIME: CPT

## 2021-07-22 PROCEDURE — 85730 THROMBOPLASTIN TIME PARTIAL: CPT

## 2021-07-22 PROCEDURE — 36415 COLL VENOUS BLD VENIPUNCTURE: CPT

## 2021-07-22 PROCEDURE — 93010 ELECTROCARDIOGRAM REPORT: CPT | Performed by: INTERNAL MEDICINE

## 2021-07-22 PROCEDURE — 99356 PR PROLONGED SVC I/P OR OBS SETTING 1ST HOUR: CPT | Performed by: INTERNAL MEDICINE

## 2021-07-22 PROCEDURE — 84100 ASSAY OF PHOSPHORUS: CPT

## 2021-07-22 PROCEDURE — 99233 SBSQ HOSP IP/OBS HIGH 50: CPT | Performed by: INTERNAL MEDICINE

## 2021-07-22 PROCEDURE — 80053 COMPREHEN METABOLIC PANEL: CPT

## 2021-07-22 PROCEDURE — 82962 GLUCOSE BLOOD TEST: CPT

## 2021-07-22 PROCEDURE — 80143 DRUG ASSAY ACETAMINOPHEN: CPT

## 2021-07-22 PROCEDURE — 95819 EEG AWAKE AND ASLEEP: CPT | Performed by: PSYCHIATRY & NEUROLOGY

## 2021-07-22 PROCEDURE — 700102 HCHG RX REV CODE 250 W/ 637 OVERRIDE(OP): Performed by: STUDENT IN AN ORGANIZED HEALTH CARE EDUCATION/TRAINING PROGRAM

## 2021-07-22 RX ORDER — MAGNESIUM SULFATE 1 G/100ML
1 INJECTION INTRAVENOUS ONCE
Status: COMPLETED | OUTPATIENT
Start: 2021-07-22 | End: 2021-07-22

## 2021-07-22 RX ORDER — LORAZEPAM 2 MG/ML
4 INJECTION INTRAMUSCULAR
Status: DISCONTINUED | OUTPATIENT
Start: 2021-07-22 | End: 2021-07-26

## 2021-07-22 RX ORDER — LORAZEPAM 2 MG/ML
1 INJECTION INTRAMUSCULAR ONCE
Status: DISCONTINUED | OUTPATIENT
Start: 2021-07-22 | End: 2021-07-22

## 2021-07-22 RX ORDER — LORAZEPAM 2 MG/ML
1 INJECTION INTRAMUSCULAR ONCE
Status: COMPLETED | OUTPATIENT
Start: 2021-07-22 | End: 2021-07-22

## 2021-07-22 RX ORDER — CHLORDIAZEPOXIDE HYDROCHLORIDE 25 MG/1
25 CAPSULE, GELATIN COATED ORAL EVERY 8 HOURS
Status: CANCELLED | OUTPATIENT
Start: 2021-07-22

## 2021-07-22 RX ORDER — UREA 10 %
500 LOTION (ML) TOPICAL
Status: DISCONTINUED | OUTPATIENT
Start: 2021-07-22 | End: 2021-07-22

## 2021-07-22 RX ORDER — POTASSIUM CHLORIDE 7.45 MG/ML
10 INJECTION INTRAVENOUS
Status: COMPLETED | OUTPATIENT
Start: 2021-07-22 | End: 2021-07-22

## 2021-07-22 RX ADMIN — POTASSIUM CHLORIDE 10 MEQ: 7.46 INJECTION, SOLUTION INTRAVENOUS at 17:00

## 2021-07-22 RX ADMIN — HALOPERIDOL LACTATE 5 MG: 5 INJECTION, SOLUTION INTRAMUSCULAR at 23:59

## 2021-07-22 RX ADMIN — LORAZEPAM 0.5 MG: 2 INJECTION INTRAMUSCULAR; INTRAVENOUS at 04:09

## 2021-07-22 RX ADMIN — LORAZEPAM 4 MG: 2 INJECTION INTRAMUSCULAR; INTRAVENOUS at 15:52

## 2021-07-22 RX ADMIN — HALOPERIDOL LACTATE 5 MG: 5 INJECTION, SOLUTION INTRAMUSCULAR at 11:16

## 2021-07-22 RX ADMIN — LORAZEPAM 1 MG: 2 INJECTION INTRAMUSCULAR; INTRAVENOUS at 01:05

## 2021-07-22 RX ADMIN — POTASSIUM CHLORIDE 10 MEQ: 7.46 INJECTION, SOLUTION INTRAVENOUS at 18:00

## 2021-07-22 RX ADMIN — NICOTINE POLACRILEX 2 MG: 2 GUM, CHEWING BUCCAL at 04:09

## 2021-07-22 RX ADMIN — THERA TABS 1 TABLET: TAB at 03:50

## 2021-07-22 RX ADMIN — Medication 100 MG: at 03:49

## 2021-07-22 RX ADMIN — LORAZEPAM 1 MG: 2 INJECTION INTRAMUSCULAR; INTRAVENOUS at 01:10

## 2021-07-22 RX ADMIN — FOLIC ACID 1 MG: 1 TABLET ORAL at 03:49

## 2021-07-22 RX ADMIN — MAGNESIUM SULFATE 1 G: 1 INJECTION INTRAVENOUS at 20:58

## 2021-07-22 RX ADMIN — FAMOTIDINE 20 MG: 20 TABLET ORAL at 03:49

## 2021-07-22 RX ADMIN — NICOTINE 14 MG: 14 PATCH TRANSDERMAL at 04:00

## 2021-07-22 RX ADMIN — NICOTINE POLACRILEX 2 MG: 2 GUM, CHEWING BUCCAL at 11:16

## 2021-07-22 RX ADMIN — ENOXAPARIN SODIUM 40 MG: 40 INJECTION SUBCUTANEOUS at 03:51

## 2021-07-22 ASSESSMENT — LIFESTYLE VARIABLES
VISUAL DISTURBANCES: NOT PRESENT
ORIENTATION AND CLOUDING OF SENSORIUM: ORIENTED AND CAN DO SERIAL ADDITIONS
HEADACHE, FULLNESS IN HEAD: NOT PRESENT
AGITATION: NORMAL ACTIVITY
TREMOR: NO TREMOR
PAROXYSMAL SWEATS: BARELY PERCEPTIBLE SWEATING. PALMS MOIST
TOTAL SCORE: 8
PAROXYSMAL SWEATS: NO SWEAT VISIBLE
AUDITORY DISTURBANCES: NOT PRESENT
AUDITORY DISTURBANCES: NOT PRESENT
SUBSTANCE_ABUSE: 1
ORIENTATION AND CLOUDING OF SENSORIUM: ORIENTED AND CAN DO SERIAL ADDITIONS
ORIENTATION AND CLOUDING OF SENSORIUM: ORIENTED AND CAN DO SERIAL ADDITIONS
PAROXYSMAL SWEATS: NO SWEAT VISIBLE
HEADACHE, FULLNESS IN HEAD: NOT PRESENT
TREMOR: TREMOR NOT VISIBLE BUT CAN BE FELT, FINGERTIP TO FINGERTIP
TREMOR: NO TREMOR
TOTAL SCORE: 1
AGITATION: NORMAL ACTIVITY
ANXIETY: NO ANXIETY (AT EASE)
AGITATION: NORMAL ACTIVITY
ORIENTATION AND CLOUDING OF SENSORIUM: ORIENTED AND CAN DO SERIAL ADDITIONS
TOTAL SCORE: 3
AGITATION: NORMAL ACTIVITY
NAUSEA AND VOMITING: NO NAUSEA AND NO VOMITING
VISUAL DISTURBANCES: NOT PRESENT
NAUSEA AND VOMITING: NO NAUSEA AND NO VOMITING
NAUSEA AND VOMITING: NO NAUSEA AND NO VOMITING
ANXIETY: NO ANXIETY (AT EASE)
NAUSEA AND VOMITING: NO NAUSEA AND NO VOMITING
TOTAL SCORE: 2
PAROXYSMAL SWEATS: NO SWEAT VISIBLE
AUDITORY DISTURBANCES: NOT PRESENT
ANXIETY: MILDLY ANXIOUS
NAUSEA AND VOMITING: NO NAUSEA AND NO VOMITING
TOTAL SCORE: 0
TREMOR: NO TREMOR
HEADACHE, FULLNESS IN HEAD: NOT PRESENT
ORIENTATION AND CLOUDING OF SENSORIUM: ORIENTED AND CAN DO SERIAL ADDITIONS
HEADACHE, FULLNESS IN HEAD: VERY MILD
PAROXYSMAL SWEATS: NO SWEAT VISIBLE
AGITATION: NORMAL ACTIVITY
NAUSEA AND VOMITING: NO NAUSEA AND NO VOMITING
ORIENTATION AND CLOUDING OF SENSORIUM: CANNOT DO SERIAL ADDITIONS OR IS UNCERTAIN ABOUT DATE
AGITATION: NORMAL ACTIVITY
TREMOR: NO TREMOR
HEADACHE, FULLNESS IN HEAD: NOT PRESENT
AUDITORY DISTURBANCES: NOT PRESENT
HEADACHE, FULLNESS IN HEAD: NOT PRESENT
VISUAL DISTURBANCES: NOT PRESENT
ANXIETY: NO ANXIETY (AT EASE)
ANXIETY: *
TOTAL SCORE: 0
PAROXYSMAL SWEATS: NO SWEAT VISIBLE
TREMOR: TREMOR NOT VISIBLE BUT CAN BE FELT, FINGERTIP TO FINGERTIP
VISUAL DISTURBANCES: NOT PRESENT
AUDITORY DISTURBANCES: NOT PRESENT
VISUAL DISTURBANCES: NOT PRESENT
AUDITORY DISTURBANCES: NOT PRESENT
ANXIETY: *
VISUAL DISTURBANCES: NOT PRESENT

## 2021-07-22 ASSESSMENT — ENCOUNTER SYMPTOMS
SEIZURES: 1
FEVER: 0
WEAKNESS: 0
FOCAL WEAKNESS: 0
MYALGIAS: 0
VOMITING: 0
MEMORY LOSS: 0
SENSORY CHANGE: 0
BLURRED VISION: 0
SHORTNESS OF BREATH: 0
LOSS OF CONSCIOUSNESS: 1
HEADACHES: 0
TINGLING: 0
DEPRESSION: 1
DOUBLE VISION: 0
CHILLS: 0
FALLS: 0
NECK PAIN: 0
NAUSEA: 0
DIZZINESS: 0
BACK PAIN: 0
SPEECH CHANGE: 0
TREMORS: 0
NERVOUS/ANXIOUS: 1
PHOTOPHOBIA: 0

## 2021-07-22 ASSESSMENT — PAIN DESCRIPTION - PAIN TYPE: TYPE: ACUTE PAIN

## 2021-07-22 NOTE — PROGRESS NOTES
[facility] Medicine Daily Progress Note    Date of Service  7/22/2021    Chief Complaint  Lexie Rush is a 26 y.o. female admitted 7/18/2021 with Tylenol overdose    Hospital Course  26-year-old female with a past medical history of polysubstance use disorder including methamphetamines, heroin, alcohol admitted on 7/18/2021 with Tylenol overdose, took 40+ tablets, level 313 then 328 on admission.  Patient was positive on UDS for methamphetamines, opioids/heroin, alcohol level elevated 183.8.  On admission patient received N-acetylcysteine, detox bag and placed on CIWA protocol.  Poison control was called.  Pregnancy test was negative. Patient did cut her wrist 3 days prior to admission, as per HPI.  Involuntary hold paperwork started in ED.    Interval Problem Update  7/20-patient seen and evaluated with nurse Purnima meza.  Patient denied any suicidal ideations.  Patient reported using all of these drugs together.  Patient stated she does use opioids and meth to counteract side effects.  Patient also drinks when she admitted.  Psychiatry has evaluated patient yesterday but she was too lethargic.  Patient's acetaminophen level undetectable at this time. NAC can finish last dose.  Patient currently on a CIWA protocol and is likely showing side effects from her multiple drug use.    7/21-patient seen and evaluated with sitter present.  Patient stated she was doing better but noticed that she is having increased tremors.  CIWA elevated to 11/20 4 hours.  Patient has required Ativan.  Psychiatry services has evaluated patient and deemed she will need inpatient psychiatric admission after she is medically cleared.  Patient has completed NAC treatment.  Liver function normalized.  Patient continued on extended legal hold.    7/22 - overnight patient had seizure episode, required IV ativan. Patient seen this morning, did not appear to have post-ictal state. Back to her baseline cognitive function. Patient stated she was  going to do outpatient psychiatry follow up, explained to her that she is currently on an extended legal hold and cannot be released unless psychiatry removes the hold.  Court petition was sent via [employer] 7/21.  ADDENDUM: Patient had 2 seizure episodes reported by nursing. Ativan 4mg IV given.   Evaluated patient at bedside.  Consulted neurology, they are aware and eval patient. I ordered ECG for tachycardia and EEG for seizures.  ADDENDUM: Neurology evaluated patient, determined seizures are from illicit drug and ETOH withdrawal, recommended EEG and Ativan, no AED yet.    I have personally seen and examined the patient at bedside. I discussed the plan of care with patient, bedside RN, charge RN,  and pharmacy.    Consultants/Specialty  psychiatry and Poison Control    Code Status  Full Code    Disposition  Patient is not medically cleared.   Anticipate discharge to [facility].  I have placed the appropriate orders for post-discharge needs.    Review of Systems  Review of Systems   Constitutional: Positive for malaise/fatigue. Negative for chills and fever.   Respiratory: Negative for cough and shortness of breath.    Cardiovascular: Negative for chest pain and palpitations.   Gastrointestinal: Negative for abdominal pain, constipation, diarrhea, nausea and vomiting.   Musculoskeletal: Negative for back pain and joint pain.   Neurological: Positive for tremors. Negative for dizziness and headaches.   All other systems reviewed and are negative.     Physical Exam  Temp:  [35.9 °C (96.7 °F)-36.9 °C (98.5 °F)] 36.5 °C (97.7 °F)  Pulse:  [] 123  Resp:  [18-36] 18  BP: (115-146)/(74-89) 133/88  SpO2:  [92 %-100 %] 96 %    Physical Exam  Vitals and nursing note reviewed.   Constitutional:       General: She is not in acute distress.     Appearance: Normal appearance. She is not ill-appearing.   HENT:      Head: Normocephalic and atraumatic.   Eyes:      General: No scleral icterus.     Extraocular Movements:  Extraocular movements intact.   Cardiovascular:      Rate and Rhythm: Regular rhythm. Tachycardia present.      Pulses: Normal pulses.      Heart sounds: Normal heart sounds.   Pulmonary:      Effort: Pulmonary effort is normal.      Breath sounds: Normal breath sounds.   Abdominal:      General: Abdomen is flat. Bowel sounds are normal.      Palpations: Abdomen is soft.   Musculoskeletal:         General: No swelling or tenderness. Normal range of motion.      Cervical back: Normal range of motion and neck supple.   Skin:     General: Skin is warm.      Capillary Refill: Capillary refill takes less than 2 seconds.      Coloration: Skin is not jaundiced.      Findings: No erythema.   Neurological:      General: No focal deficit present.      Mental Status: She is alert and oriented to person, place, and time. Mental status is at baseline.      Motor: Tremor present. No weakness.   Psychiatric:         Mood and Affect: Mood normal.         Behavior: Behavior normal.         Thought Content: Thought content normal.         Judgment: Judgment normal.      Comments: Patient denies any suicidal or homicidal ideation  Patient was in regret for consuming Tylenol while she was drinking         Fluids    Intake/Output Summary (Last 24 hours) at 7/22/2021 1624  Last data filed at 7/22/2021 1300  Gross per 24 hour   Intake 360 ml   Output --   Net 360 ml       Laboratory  Recent Labs     07/20/21  0543   WBC 7.5   RBC 4.33   HEMOGLOBIN 13.2   HEMATOCRIT 39.1   MCV 90.3   MCH 30.5   MCHC 33.8   RDW 43.9   PLATELETCT 253   MPV 9.7     Recent Labs     07/20/21  0543 07/21/21  0627 07/22/21  0134   SODIUM 135 137 135   POTASSIUM 4.2 4.1 3.5*   CHLORIDE 105 105 101   CO2 19* 22 17*   GLUCOSE 122* 99 120*   BUN 5* 7* 8   CREATININE 0.59 0.59 0.62   CALCIUM 8.8 8.8 8.8     Recent Labs     07/20/21  0543 07/21/21  0627 07/22/21  0134   APTT  --   --  24.7   INR 1.09 0.99 0.96               Imaging  No orders to display   No imaging  ordered on this admission at this time.    Assessment/Plan  * Tylenol overdose, intentional self-harm, initial encounter (HCC)- (present on admission)  Assessment & Plan  Reportedly took 40 tablets of Tylenol at 3 PM.  Tylenol level 313 --> 64  completed NAC per protocol  Legal hold on admission, extended by psychiatry team.  Suicidal precaution, fall, aspiration, seizure precautions  Improved, LFTs improved    As per psychiatric services, patient will need inpatient psychiatric facility admission once she is medically cleared.  At this time patient is not medically cleared, currently undergoing withdrawal from her multiple illicit drugs.  Legal hold extended and court petition filed via [employer] 7/21    Alcohol withdrawal seizure with complication (HCC)  Assessment & Plan  Patient had seizure episode early morning 7/22. Required IV ativan. Patient back to baseline on morning evaluation.  Difficult to start Librium as patient had tylenol toxicity, high risk for liver failure.  - continue ativan IV for seizure  - continue seizure precautions    Addendum 7/22  Spent extra time on patient's case, face to face.  RN reported patient had second and third seizure today while patient was in bed, protecting airway, 97% on room air, -1402, required ativan 4mg IV.  Patient had brief post-ictal state between seizures, as reported by bedside nurse Kenyatta Arboleda. Patient was not given oral electrolytes yet, have had to change to IV magnesium and potassium. Requested consult with neurology for recurring seizures, Dr. Esposito aware of patient and will evaluate.  Ordered EEG to evaluate patient, rule out any nonconvulsive status epilepticus.  Ordered ECG to rule out tachycardia etiology.  Start time 3:45PM  End time: 4:16PM    Tobacco abuse- (present on admission)  Assessment & Plan  Tobacco cessation counseling and education provided for 4 minutes. Nicotine replacement options provided including patch, and further medical  treatments including Wellbutrin and Chantix. As well as over the counter options of lozenges and gum.    Polysubstance abuse (HCC)- (present on admission)  Assessment & Plan  UDS positive for methamphetamine, opioids  We will plan to provide with illicit drug counseling when alcohol intoxication resolves.   7/20-patient counseled with nurse present in the room about illicit drug use cessation    Hypokalemia- (present on admission)  Assessment & Plan  Monitor and replace    Alcohol intoxication (HCC)- (present on admission)  Assessment & Plan  Supportive care  Monitor for withdrawal  Continue CIWA protocol, patient continues to require Ativan.  Patient's not medically cleared     VTE prophylaxis: enoxaparin ppx    I have performed a physical exam and reviewed and updated ROS and Plan today (7/22/2021). In review of yesterday's note (7/21/2021), there are no changes except as documented above.

## 2021-07-22 NOTE — PROCEDURES
VIDEO ELECTROENCEPHALOGRAM REPORT      Referring provider: Dr. Esposito     DOS: 07/22/21 (total recording of 23 minutes).     INDICATION:  Lexie Rush 26 y.o. female presenting with seizure     CURRENT ANTIEPILEPTIC REGIMEN: ativan     TECHNIQUE: 30 channel video electroencephalogram (EEG) was performed in accordance with the international 10-20 system. The study was reviewed in bipolar and referential montages. The recording examined the patient during   awake, drowsy and sleep states    DESCRIPTION OF THE RECORD:  During the wakefulness, the background showed a symmetrical excessive beta activity at 14-16 Hz.  There was reactivity to eye closure/opening.  There is  normal anterior-posterior gradient was noted with faster beta frequencies seen anteriorly.  During drowsiness, roving eye movements were noted. During the sleep state, symmetrical high-amplitude sleep spindles and vertex sharps were seen in the leads over the central regions.     ACTIVATION PROCEDURES:     Hyperventilation was performed by the patient for a total of 3 minutes. The technician performing the test noted good effort. No physiological build up seen.     Intermittent Photic stimulation was performed in a stepwise fashion from 1 to 30 Hz and elicited a normal response (photic driving), most noticeable in the posterior leads.    ICTAL AND/OR INTERICTAL FINDINGS:   No focal or generalized epileptiform activity noted. No regional slowing was seen during this routine study.  No clinical events or seizures were reported or recorded during the study.     EKG: sampling of the EKG recording demonstrated sinus tachycardia     EVENTS: none     INTERPRETATION:    This is a  normal video EEG recording in the awake, drowsy/sleep state(s).  Excessive fast frequency is likely related to medication effect.     Note: A normal EEG does not rule out epilepsy.  If the clinical suspicion remains high for seizures, a prolonged recording to capture clinical or  subclinical events may be helpful.    Yusef Gross MD  Diplomate in Neurology&Epilepsy  Office: [phone]  Fax: [phone]

## 2021-07-22 NOTE — CARE PLAN
The patient is Watcher - Medium risk of patient condition declining or worsening    Shift Goals  Clinical Goals: for CIWA scores to reduce to 0; be free of seizure acticity; free of injury  Patient Goals: to sleep  Family Goals: n/a    Progress made toward(s) clinical / shift goals:  Lexie's 1:1 sitter continues, as well as padding around bed's railings as part of seizure precautions.  She is able to recognize her mom as a support person in her life. She did become upset this morning when her mom informed her over the phone that she wasn't feeling well, and might not be able to come visit her in the hospital, but later was able to speak with her mom over the phone again and expressed understanding if her mother was not able to visit. Lexie denies thoughts or sensations of SI, and admits that she doesn't know why she took all that tylenol.  She remains on a legal hold at this time. Her CIWA scores remain 0, but then she did have a seizure this afternoon.    Patient is not progressing towards the following goals: Lexie had another set of seizures this afternoon at 15:50, and needed 4mg IV ativan.  MD notified.      Problem: Optimal Care for Alcohol Withdrawal  Goal: Optimal Care for the alcohol withdrawal patient  Outcome: Not Progressing        Problem: Provide Safe Environment  Goal: Suicide environmental safety, protocols, policies, and practices will be implemented  Outcome: Progressing     Problem: Psychosocial  Goal: Patient's ability to identify and develop effective coping behaviors will improve  Outcome: Progressing  Goal: Patient's ability to identify and utilize available support systems will improve  Outcome: Progressing     Problem: Seizure Precautions  Goal: Implementation of seizure precautions  Outcome: Progressing

## 2021-07-22 NOTE — CONSULTS
Brief Behavioral Health Note:    Attempted twice today to complete a behavioral health re-evaluation however patient was unable to be assessed. Patient was sleeping when the first attempt was made, then when I returned to see if patient was awake, was told by patient's sitter that she just had a seizure.  At this time patient remains on a hold and will be re-assessed when patient is medically stable and able to engage in the interview process. Initial recommendations remain in place.       Recommendations and Observation Level:  Sitter: Yes one to one  Phone: no  Visitors: yes (Mother only) [place] Rush  Personal belongings: no  Please transfer to an inpatient psychiatric hospital when patient is medically cleared.     Legal Hold: extended    Thank you,    Elaine Gomez, Ph.D., [facility]

## 2021-07-22 NOTE — DISCHARGE PLANNING
Anticipated Discharge Disposition:   Inpatient Psych    Action:   Received Court documents from ALIVIA Jeffrey. Placed in pt's chart.     Discussed discharge planning needs during rounds. Per MD, pt is not medically cleared yet, pt had a seizure last night.    Barriers to Discharge:   Medical clearance  Legal Hold  Inpatient psych placement    Plan:   Hospital Care Management will continue to follow and assist with discharge planning needs.

## 2021-07-22 NOTE — PROGRESS NOTES
RRT called due to witnessed seizures by nursing staff.  Seizures aborted with Ativan 1 mg x 2.  Upon my arrival , patient somewhat postictal but able to follow commands.  Seizure  precautions were in place.    Assessment   Seizures likely secondary to Alcohol   Admitted with tylenol overdose on 7/18/2021  Polysubstance abuse (UDS positive for opiates and amphetamine)    Plan   Ativan as needed for seizures  Continue with seizure precautions  Continue with CIWA protocol

## 2021-07-22 NOTE — PROGRESS NOTES
Pt had 4 witnessed seizures lasting approximately 1 minute each, all 4 over 10 minutes total. 1 mg x2 doses given 5 minutes apart. RRT called, MD Ricardo at bedside to assess pt. Orders placed for ativan for seizures. Pt sleeping at this time.

## 2021-07-22 NOTE — PROGRESS NOTES
Aishwarya came out of room to report that Lexie was having a seizure.  This RN went into room, Lexie's entire body was stiffened up and shaking, with head cocked back and mouth open.   Pulse ox checked, showing 97% on room air with HR at 143 bpm.   Seizure lasted <30 seconds, and Lexie was able to make eye contact with this RN less than 1 minute later, but then she started having another seizure- same features.  2 mg IV ativan given; seizure activity continued, so another 2mg IV ativan given.  Seizure then ceased.   Mouth suctioned. Pulse ox sat maintained mid-upper 90s during entirety of both seizures.  HR reduced to 121 bpm after seizures where over.    Lexie's airway was protected for entirety of episodes.    She did not loose bladder control.   She was boosted up in bed to help keep chest and airway open.  MD notified.

## 2021-07-22 NOTE — ASSESSMENT & PLAN NOTE
Resolved.  No seizure for 2 days now, medically cleared.    Patient had seizure episode early morning 7/22. Required IV ativan. Patient back to baseline on morning evaluation.  Difficult to start Librium as patient had tylenol toxicity, high risk for liver failure.  Patient has had total of 4 withdrawal seizures. (7/22 - early morning, 2 in evening. 7/23 - 6am morning)  Reviewed EEG results, does not show epileptic form waves.  Neurology evaluated patient, determined seizures are from illicit drug and ETOH withdrawal, recommended EEG and Ativan, no AED yet.  - continue ativan 4mg IV for seizure PRN  - continue seizure precautions

## 2021-07-22 NOTE — CARE PLAN
Problem: Provide Safe Environment  Goal: Suicide environmental safety, protocols, policies, and practices will be implemented  Note: SI LH precautions in place.        Problem: Pain - Standard  Goal: Alleviation of pain or a reduction in pain to the patient’s comfort goal  Note: PRN pain medications in use for pain control.     The patient is Stable - Low risk of patient condition declining or worsening    Shift Goals  Clinical Goals: safety and decrease in CIWA rating  Patient Goals: rest  Family Goals: safety and comfort    Progress made toward(s) clinical / shift goals:      Patient is not progressing towards the following goals:

## 2021-07-23 PROBLEM — Z04.6 INVOLUNTARY COMMITMENT: Status: ACTIVE | Noted: 2021-07-23

## 2021-07-23 LAB
ALBUMIN SERPL BCP-MCNC: 4.2 G/DL (ref 3.2–4.9)
APAP SERPL-MCNC: <5 UG/ML (ref 10–30)
APAP SERPL-MCNC: <5 UG/ML (ref 10–30)
BUN SERPL-MCNC: 9 MG/DL (ref 8–22)
CALCIUM SERPL-MCNC: 8.8 MG/DL (ref 8.5–10.5)
CHLORIDE SERPL-SCNC: 101 MMOL/L (ref 96–112)
CO2 SERPL-SCNC: 22 MMOL/L (ref 20–33)
CREAT SERPL-MCNC: 0.52 MG/DL (ref 0.5–1.4)
GLUCOSE SERPL-MCNC: 101 MG/DL (ref 65–99)
MAGNESIUM SERPL-MCNC: 2.3 MG/DL (ref 1.5–2.5)
PHOSPHATE SERPL-MCNC: 3.8 MG/DL (ref 2.5–4.5)
POTASSIUM SERPL-SCNC: 3.8 MMOL/L (ref 3.6–5.5)
SODIUM SERPL-SCNC: 136 MMOL/L (ref 135–145)

## 2021-07-23 PROCEDURE — 99223 1ST HOSP IP/OBS HIGH 75: CPT | Performed by: PSYCHIATRY & NEUROLOGY

## 2021-07-23 PROCEDURE — 700111 HCHG RX REV CODE 636 W/ 250 OVERRIDE (IP): Performed by: STUDENT IN AN ORGANIZED HEALTH CARE EDUCATION/TRAINING PROGRAM

## 2021-07-23 PROCEDURE — 99233 SBSQ HOSP IP/OBS HIGH 50: CPT | Performed by: INTERNAL MEDICINE

## 2021-07-23 PROCEDURE — 700102 HCHG RX REV CODE 250 W/ 637 OVERRIDE(OP): Performed by: INTERNAL MEDICINE

## 2021-07-23 PROCEDURE — 83735 ASSAY OF MAGNESIUM: CPT

## 2021-07-23 PROCEDURE — A9270 NON-COVERED ITEM OR SERVICE: HCPCS | Performed by: STUDENT IN AN ORGANIZED HEALTH CARE EDUCATION/TRAINING PROGRAM

## 2021-07-23 PROCEDURE — 700102 HCHG RX REV CODE 250 W/ 637 OVERRIDE(OP): Performed by: STUDENT IN AN ORGANIZED HEALTH CARE EDUCATION/TRAINING PROGRAM

## 2021-07-23 PROCEDURE — 770020 HCHG ROOM/CARE - TELE (206)

## 2021-07-23 PROCEDURE — 80069 RENAL FUNCTION PANEL: CPT

## 2021-07-23 PROCEDURE — 36415 COLL VENOUS BLD VENIPUNCTURE: CPT

## 2021-07-23 PROCEDURE — 80143 DRUG ASSAY ACETAMINOPHEN: CPT

## 2021-07-23 PROCEDURE — A9270 NON-COVERED ITEM OR SERVICE: HCPCS | Performed by: INTERNAL MEDICINE

## 2021-07-23 PROCEDURE — 700111 HCHG RX REV CODE 636 W/ 250 OVERRIDE (IP): Performed by: INTERNAL MEDICINE

## 2021-07-23 RX ADMIN — THERA TABS 1 TABLET: TAB at 05:08

## 2021-07-23 RX ADMIN — NICOTINE 14 MG: 14 PATCH TRANSDERMAL at 05:08

## 2021-07-23 RX ADMIN — LORAZEPAM 2 MG: 2 TABLET ORAL at 15:36

## 2021-07-23 RX ADMIN — HALOPERIDOL LACTATE 5 MG: 5 INJECTION, SOLUTION INTRAMUSCULAR at 15:27

## 2021-07-23 RX ADMIN — FAMOTIDINE 20 MG: 20 TABLET ORAL at 17:20

## 2021-07-23 RX ADMIN — FOLIC ACID 1 MG: 1 TABLET ORAL at 05:08

## 2021-07-23 RX ADMIN — LORAZEPAM 2 MG: 2 INJECTION INTRAMUSCULAR; INTRAVENOUS at 06:40

## 2021-07-23 RX ADMIN — NICOTINE POLACRILEX 2 MG: 2 GUM, CHEWING BUCCAL at 00:17

## 2021-07-23 RX ADMIN — LORAZEPAM 2 MG: 2 TABLET ORAL at 20:05

## 2021-07-23 RX ADMIN — FAMOTIDINE 20 MG: 20 TABLET ORAL at 05:08

## 2021-07-23 RX ADMIN — NICOTINE POLACRILEX 2 MG: 2 GUM, CHEWING BUCCAL at 05:28

## 2021-07-23 RX ADMIN — ENOXAPARIN SODIUM 40 MG: 40 INJECTION SUBCUTANEOUS at 05:09

## 2021-07-23 RX ADMIN — NICOTINE POLACRILEX 2 MG: 2 GUM, CHEWING BUCCAL at 13:09

## 2021-07-23 RX ADMIN — Medication 100 MG: at 05:09

## 2021-07-23 ASSESSMENT — LIFESTYLE VARIABLES
ANXIETY: NO ANXIETY (AT EASE)
HEADACHE, FULLNESS IN HEAD: NOT PRESENT
TREMOR: TREMOR NOT VISIBLE BUT CAN BE FELT, FINGERTIP TO FINGERTIP
VISUAL DISTURBANCES: NOT PRESENT
VISUAL DISTURBANCES: NOT PRESENT
NAUSEA AND VOMITING: NO NAUSEA AND NO VOMITING
AGITATION: NORMAL ACTIVITY
HEADACHE, FULLNESS IN HEAD: NOT PRESENT
TOTAL SCORE: 13
HEADACHE, FULLNESS IN HEAD: NOT PRESENT
NAUSEA AND VOMITING: NO NAUSEA AND NO VOMITING
ORIENTATION AND CLOUDING OF SENSORIUM: CANNOT DO SERIAL ADDITIONS OR IS UNCERTAIN ABOUT DATE
ANXIETY: MODERATELY ANXIOUS OR GUARDED, SO ANXIETY IS INFERRED
AUDITORY DISTURBANCES: NOT PRESENT
AGITATION: *
PAROXYSMAL SWEATS: NO SWEAT VISIBLE
AUDITORY DISTURBANCES: NOT PRESENT
NAUSEA AND VOMITING: NO NAUSEA AND NO VOMITING
HEADACHE, FULLNESS IN HEAD: MILD
NAUSEA AND VOMITING: NO NAUSEA AND NO VOMITING
TREMOR: NO TREMOR
TOTAL SCORE: 11
TREMOR: MODERATE TREMOR WITH ARMS EXTENDED
TOTAL SCORE: MILD ITCHING, PINS AND NEEDLES SENSATION, BURNING OR NUMBNESS
ORIENTATION AND CLOUDING OF SENSORIUM: ORIENTED AND CAN DO SERIAL ADDITIONS
TOTAL SCORE: 2
ANXIETY: *
AUDITORY DISTURBANCES: NOT PRESENT
HEADACHE, FULLNESS IN HEAD: MODERATE
ANXIETY: NO ANXIETY (AT EASE)
TOTAL SCORE: MODERATE ITCHING, PINS AND NEEDLES SENSATION, BURNING OR NUMBNESS
NAUSEA AND VOMITING: NO NAUSEA AND NO VOMITING
ANXIETY: NO ANXIETY (AT EASE)
NAUSEA AND VOMITING: NO NAUSEA AND NO VOMITING
ORIENTATION AND CLOUDING OF SENSORIUM: ORIENTED AND CAN DO SERIAL ADDITIONS
AUDITORY DISTURBANCES: NOT PRESENT
ANXIETY: NO ANXIETY (AT EASE)
TREMOR: NO TREMOR
NAUSEA AND VOMITING: NO NAUSEA AND NO VOMITING
ORIENTATION AND CLOUDING OF SENSORIUM: ORIENTED AND CAN DO SERIAL ADDITIONS
TREMOR: MODERATE TREMOR WITH ARMS EXTENDED
HEADACHE, FULLNESS IN HEAD: VERY MILD
AUDITORY DISTURBANCES: NOT PRESENT
TREMOR: TREMOR NOT VISIBLE BUT CAN BE FELT, FINGERTIP TO FINGERTIP
AGITATION: SOMEWHAT MORE THAN NORMAL ACTIVITY
PAROXYSMAL SWEATS: NO SWEAT VISIBLE
VISUAL DISTURBANCES: NOT PRESENT
PAROXYSMAL SWEATS: BARELY PERCEPTIBLE SWEATING. PALMS MOIST
ANXIETY: *
AGITATION: NORMAL ACTIVITY
ORIENTATION AND CLOUDING OF SENSORIUM: ORIENTED AND CAN DO SERIAL ADDITIONS
TOTAL SCORE: MILD ITCHING, PINS AND NEEDLES SENSATION, BURNING OR NUMBNESS
HEADACHE, FULLNESS IN HEAD: MILD
ORIENTATION AND CLOUDING OF SENSORIUM: ORIENTED AND CAN DO SERIAL ADDITIONS
AUDITORY DISTURBANCES: NOT PRESENT
ORIENTATION AND CLOUDING OF SENSORIUM: ORIENTED AND CAN DO SERIAL ADDITIONS
ANXIETY: NO ANXIETY (AT EASE)
NAUSEA AND VOMITING: NO NAUSEA AND NO VOMITING
TOTAL SCORE: 10
TREMOR: TREMOR NOT VISIBLE BUT CAN BE FELT, FINGERTIP TO FINGERTIP
TOTAL SCORE: 4
HEADACHE, FULLNESS IN HEAD: MODERATE
VISUAL DISTURBANCES: NOT PRESENT
AGITATION: NORMAL ACTIVITY
PAROXYSMAL SWEATS: NO SWEAT VISIBLE
TOTAL SCORE: 3
TOTAL SCORE: 3
AGITATION: NORMAL ACTIVITY
AUDITORY DISTURBANCES: NOT PRESENT
VISUAL DISTURBANCES: NOT PRESENT
AGITATION: NORMAL ACTIVITY
VISUAL DISTURBANCES: NOT PRESENT
PAROXYSMAL SWEATS: NO SWEAT VISIBLE
TREMOR: TREMOR NOT VISIBLE BUT CAN BE FELT, FINGERTIP TO FINGERTIP
TOTAL SCORE: 0
PAROXYSMAL SWEATS: NO SWEAT VISIBLE
VISUAL DISTURBANCES: NOT PRESENT
AUDITORY DISTURBANCES: NOT PRESENT
ORIENTATION AND CLOUDING OF SENSORIUM: ORIENTED AND CAN DO SERIAL ADDITIONS
PAROXYSMAL SWEATS: BARELY PERCEPTIBLE SWEATING. PALMS MOIST
AGITATION: NORMAL ACTIVITY
VISUAL DISTURBANCES: NOT PRESENT
PAROXYSMAL SWEATS: NO SWEAT VISIBLE

## 2021-07-23 ASSESSMENT — ENCOUNTER SYMPTOMS
PALPITATIONS: 0
ABDOMINAL PAIN: 0
BACK PAIN: 0
DIARRHEA: 0
COUGH: 0
VOMITING: 0
HEADACHES: 0
FEVER: 0
CONSTIPATION: 0
TREMORS: 1
DIZZINESS: 0
CHILLS: 0
SHORTNESS OF BREATH: 0
NAUSEA: 0

## 2021-07-23 ASSESSMENT — PAIN DESCRIPTION - PAIN TYPE: TYPE: ACUTE PAIN

## 2021-07-23 NOTE — CONSULTS
Referring Physician: Dr. Nicho Sparks    Referral Reason: Intractable seizure    HPI:  Ms. Lexie Rush is a 26 y.o. right-handed female with history of depression, polysubstance abuse and Henoch-Schönlein purpura, who presented on 7/18/2021 due to drug overdose with Tylenol.  Apparently she has been very depressed lately and cut her wrist 3 days prior to admission and on the day of admission she took 40 tablets of Tylenol.  Her acetaminophen level was 313.  Her urine drug screen was also positive for amphetamine and opiates.  She has been in the hospital since 7/18/2021 and today she was noted to have witnessed seizure-like activity requiring administration of Ativan 4 mg IV.  At present time she is postictal but able to follow simple commands.  She is currently on legal hold pending admission to [facility] for her suicidal ideation and attempt.    ROS:   Review of Systems   Constitutional: Negative for chills, fever and malaise/fatigue.   HENT: Negative for hearing loss and tinnitus.    Eyes: Negative for blurred vision, double vision and photophobia.   Respiratory: Negative for shortness of breath.    Cardiovascular: Negative for chest pain.   Gastrointestinal: Negative for nausea and vomiting.   Genitourinary: Negative for hematuria.   Musculoskeletal: Negative for back pain, falls, myalgias and neck pain.   Skin: Negative for rash.   Neurological: Positive for seizures and loss of consciousness. Negative for dizziness, tingling, tremors, sensory change, speech change, focal weakness, weakness and headaches.   Psychiatric/Behavioral: Positive for depression, substance abuse and suicidal ideas. Negative for memory loss. The patient is nervous/anxious.        Past Medical History:   Past Medical History:   Diagnosis Date   • Henoch-Schonlein purpura (HCC)     DX in at a younge age.    • Miscarriage     twins 25 weeks   • Supervision of normal first pregnancy 5/11/2010   • Teen pregnancy 5/11/2010        Past Surgical History:   Past Surgical History:   Procedure Laterality Date   • PELVISCOPY  4/7/2014    Performed by Brendon Grove M.D. at [facility]       Social History:   Social History     Socioeconomic History   • Marital status: Single     Spouse name: Not on file   • Number of children: Not on file   • Years of education: Not on file   • Highest education level: Not on file   Occupational History   • Not on file   Tobacco Use   • Smoking status: Current Every Day Smoker     Types: Cigarettes   • Smokeless tobacco: Never Used   • Tobacco comment: smoke e cigarettes   Substance and Sexual Activity   • Alcohol use: Yes     Comment: 1/mo    • Drug use: Yes     Types: Marijuana     Comment: socially    • Sexual activity: Yes     Partners: Male   Other Topics Concern   • Not on file   Social History Narrative   • Not on file     Social Determinants of Health     Financial Resource Strain:    • Difficulty of Paying Living Expenses:    Food Insecurity:    • Worried About Running Out of Food in the Last Year:    • Ran Out of Food in the Last Year:    Transportation Needs:    • Lack of Transportation (Medical):    • Lack of Transportation (Non-Medical):    Physical Activity:    • Days of Exercise per Week:    • Minutes of Exercise per Session:    Stress:    • Feeling of Stress :    Social Connections:    • Frequency of Communication with Friends and Family:    • Frequency of Social Gatherings with Friends and Family:    • Attends [employer] Services:    • Active Member of Clubs or Organizations:    • Attends Club or Organization Meetings:    • Marital Status:    Intimate Partner Violence:    • Fear of Current or Ex-Partner:    • Emotionally Abused:    • Physically Abused:    • Sexually Abused:        Family Hx:   Family History   Problem Relation Age of Onset   • Hypertension Mother    • Hypertension Paternal Aunt    • Cancer Other        Current Medications:   Current Facility-Administered  Medications   Medication Dose Route Frequency Provider Last Rate Last Admin   • LORazepam (ATIVAN) injection 4 mg  4 mg Intravenous Q10 MIN PRN Los Ricardo M.D.   4 mg at 07/22/21 1552   • magnesium sulfate in D5W IVPB premix 1 g  1 g Intravenous Once Nicho Sparks M.D.       • potassium chloride (KCL) ivpb 10 mEq  10 mEq Intravenous Q HOUR Nicho Sparks M.D.       • melatonin tablet 5 mg  5 mg Oral HS PRN ANANDA GuptaOJose   5 mg at 07/21/21 2202   • LORazepam (ATIVAN) tablet 0.5 mg  0.5 mg Oral Q4HRS PRN Phillip Godinez M.D.   0.5 mg at 07/21/21 1700   • LORazepam (ATIVAN) tablet 1 mg  1 mg Oral Q4HRS PRN Phillip Godinez M.D.   1 mg at 07/20/21 1448    Or   • LORazepam (ATIVAN) injection 0.5 mg  0.5 mg Intravenous Q4HRS PRN Phillip Godinez M.D.   0.5 mg at 07/22/21 0409   • LORazepam (ATIVAN) tablet 2 mg  2 mg Oral Q2HRS PRN Phillip Godinez M.D.   2 mg at 07/20/21 1655    Or   • LORazepam (ATIVAN) injection 1 mg  1 mg Intravenous Q2HRS PRN Phillip Godinez M.D.   1 mg at 07/19/21 2225   • thiamine (Vitamin B-1) tablet 100 mg  100 mg Oral DAILY Phillip Godinez M.D.   100 mg at 07/22/21 0349    And   • multivitamin (THERAGRAN) tablet 1 tablet  1 tablet Oral DAILY Phillip Godinez M.D.   1 tablet at 07/22/21 0350    And   • folic acid (FOLVITE) tablet 1 mg  1 mg Oral DAILY Phillip Godinez M.D.   1 mg at 07/22/21 0349   • nicotine (NICODERM) 14 MG/24HR 14 mg  14 mg Transdermal Daily-0600 ANANDA ChoiOJose   14 mg at 07/22/21 0400    And   • nicotine polacrilex (NICORETTE) 2 MG piece 2 mg  2 mg Oral Q HOUR PRN Mark Roque D.O.   2 mg at 07/22/21 1116   • senna-docusate (PERICOLACE or SENOKOT S) 8.6-50 MG per tablet 2 tablet  2 tablet Oral BID Rg Brian M.D.   2 tablet at 07/19/21 1630    And   • polyethylene glycol/lytes (MIRALAX) PACKET 1 Packet  1 Packet Oral QDAY PRN Rg Brian M.D.        And   • magnesium hydroxide (MILK OF MAGNESIA) suspension 30 mL  30 mL Oral QDAY PRN Rg Brian,  M.D.        And   • bisacodyl (DULCOLAX) suppository 10 mg  10 mg Rectal QDAY PRN Rg Brian M.D.       • Respiratory Therapy Consult   Nebulization Continuous RT Rg Brian M.D.       • enoxaparin (LOVENOX) inj 40 mg  40 mg Subcutaneous DAILY Rg Brian M.D.   40 mg at 07/22/21 0351   • cloNIDine (CATAPRES) tablet 0.1 mg  0.1 mg Oral Q6HRS PRN Rg Brian M.D.       • enalaprilat (VASOTEC) injection 1.25 mg  1.25 mg Intravenous Q6HRS PRN Rg Brian M.D.       • labetalol (NORMODYNE/TRANDATE) injection 10 mg  10 mg Intravenous Q4HRS PRN Rg Brian M.D.       • ondansetron (ZOFRAN) syringe/vial injection 4 mg  4 mg Intravenous Q4HRS PRN Rg Brian M.D.   4 mg at 07/19/21 2225   • promethazine (PHENERGAN) tablet 12.5-25 mg  12.5-25 mg Oral Q4HRS PRN Rg Brian M.D.       • promethazine (PHENERGAN) suppository 12.5-25 mg  12.5-25 mg Rectal Q4HRS PRN Rg Brian M.D.       • prochlorperazine (COMPAZINE) injection 5-10 mg  5-10 mg Intravenous Q4HRS PRN Rg Brian M.D.       • haloperidol lactate (HALDOL) injection 2-5 mg  2-5 mg Intravenous Q4HRS PRN Rg Brian M.D.   5 mg at 07/22/21 1116   • famotidine (PEPCID) tablet 20 mg  20 mg Oral BID Rg Brian M.D.   20 mg at 07/22/21 0349       Allergies:   Allergies   Allergen Reactions   • Amoxicillin Unspecified     Causes Henoch Purpura. Unknown allergic reaction   • Ibuprofen Unspecified     Causes Henoch Purpura. Takes Advil without problems.       Physical Exam:   Vitals:    07/22/21 0738 07/22/21 0801 07/22/21 1215 07/22/21 1611   BP: 115/74  116/77 133/88   Pulse: 75  (!) 109 (!) 123   Resp: 18 (!) 22 18 18   Temp: 36.6 °C (97.8 °F)  35.9 °C (96.7 °F) 36.5 °C (97.7 °F)   TempSrc: Temporal  Temporal Temporal   SpO2: 99%  100% 96%   Weight:       Height:           Physical Exam   GENERAL:  Lying in the hospital bed very drowsy and lethargic.  Head: Normocephalic and atraumatic.   Eyes:  Pupils are equal, round, and reactive to light. EOM are normal.   Cardiovascular: Normal rate and regular rhythm.  Tachycardic.  Pulmonary/Chest: Breath sounds normal.   Abdominal: Soft. Bowel sounds are normal. He exhibits no distension. There is no tenderness.   Skin: Skin is warm and dry. No rash noted. No erythema.  Neuro Exam  MENTAL STATUS: She is very drowsy, status post 4 mg Ativan CRANIAL NERVES:  PERRL, EOMI with no nystagmus, face is symmetric, facial sensation is intact.  MOTOR:  Motor examination showed normal strength in direct testing of both upper and lower extremities, proximal and distal.    SENSATION:  Intact to light touch, temperature .  REFLEXES:  2+ and symmetric, toes are downgoing bilaterally  COORDINATION: Could not be tested.  GAIT:  Deferred     Labs:  Recent Labs     07/20/21 0543   WBC 7.5   RBC 4.33   HEMOGLOBIN 13.2   HEMATOCRIT 39.1   MCV 90.3   MCH 30.5   MCHC 33.8   RDW 43.9   PLATELETCT 253   MPV 9.7     Recent Labs     07/20/21 0543 07/21/21 0627 07/22/21  0134   SODIUM 135 137 135   POTASSIUM 4.2 4.1 3.5*   CHLORIDE 105 105 101   CO2 19* 22 17*   GLUCOSE 122* 99 120*   BUN 5* 7* 8   CREATININE 0.59 0.59 0.62   CALCIUM 8.8 8.8 8.8     Recent Labs     07/20/21 0543 07/21/21 0627 07/22/21  0134   APTT  --   --  24.7   INR 1.09 0.99 0.96                 Recent Labs     07/20/21 0543 07/21/21 0627 07/22/21  0134   SODIUM 135 137 135   POTASSIUM 4.2 4.1 3.5*   CHLORIDE 105 105 101   CO2 19* 22 17*   GLUCOSE 122* 99 120*   BUN 5* 7* 8     Recent Labs     07/20/21 0543 07/21/21 0627 07/22/21  0134   SODIUM 135 137 135   POTASSIUM 4.2 4.1 3.5*   CHLORIDE 105 105 101   CO2 19* 22 17*   BUN 5* 7* 8   CREATININE 0.59 0.59 0.62   MAGNESIUM 1.9 1.9 1.9   PHOSPHORUS 1.9* 2.7 2.7   CALCIUM 8.8 8.8 8.8     Recent Labs     07/20/21 0543 07/21/21  0627 07/22/21  0134   APTT  --   --  24.7   INR 1.09 0.99 0.96     No results found for this or any previous visit.      Imaging  reviewed:    No orders to display          Assessment/Plan:  26 y.o. female with history of depression who was admitted on 7/18/2021 due to overdose with Tylenol as a suicide attempt.  She also has history of polysubstance abuse and her UDS was positive for opiates, amphetamine and alcohol.  She was noted to have an episode of generalized tonic-clonic seizure overnight and about 2 hours ago she had 2 episodes of back-to-back generalized tonic-clonic seizure witnessed by her bedside RN.  She was given a total of 4 mg Ativan and seizure was aborted.  She had no bowel or bladder incontinence and there is no tongue biting.  She is undergoing EEG at the present time.  I will order brain MRI with and without contrast.  Her seizures are likely provoked by intoxication and or withdrawal from polysubstance she had used.  We will continue monitor her seizures and treat breakthrough seizures with Ativan.  If there is a focal abnormalities on EEG or brain MRI will consider starting antiepileptic medication otherwise no indication for seizure medication at this point.  Continue with seizure precaution.  She needs to be reported to DMV for driving restriction.  Discussed with bedside RN.

## 2021-07-23 NOTE — PROGRESS NOTES
Hospital Medicine Daily Progress Note    Date of Service  7/23/2021    Chief Complaint  Lexie Rush is a 26 y.o. female admitted 7/18/2021 with Tylenol overdose    Hospital Course  26-year-old female with a past medical history of polysubstance use disorder including methamphetamines, heroin, alcohol admitted on 7/18/2021 with Tylenol overdose, took 40+ tablets, level 313 then 328 on admission.  Patient was positive on UDS for methamphetamines, opioids/heroin, alcohol level elevated 183.8.  On admission patient received N-acetylcysteine, detox bag and placed on CIWA protocol.  Poison control was called.  Pregnancy test was negative. Patient did cut her wrist 3 days prior to admission, as per HPI.  Involuntary hold paperwork started in ED.    7/20 - patient legal hold extended. Psychiatry team requesting patient dc to inpatient psychiatric hospital when medically cleared.  7/21 - completed NAC treatments, acetaminophen levels remain undetectable, LFT normalized.  7/22 - patient had total 3 seizures (1 early morning, 2 afternoon).   Neurology evaluated patient, determined seizures are from illicit drug and ETOH withdrawal, recommended EEG and Ativan, no AED yet.    Interval Problem Update  7/23-patient seen with sitter in the room.  Patient stated she was doing well, was confuses why she cannot do outpatient psychiatry, asked again today.  I explained to her again today that she is on a legal/involuntary hold and is a binding hold on her that she cannot leave and will need inpatient psychiatric evaluation after she is done in the hospital.  As per nursing, patient had a seizure episode around 6:00 this morning and required Ativan 2 mg.  Patient's did not appear to be in any chronic consistent with postictal state.  Spoke with Dr. Iqbal, patient will continue on route to inpatient psychiatric facility on discharge when medically cleared.    I have personally seen and examined the patient at bedside. I discussed the  plan of care with patient, bedside RN, charge RN,  and pharmacy.    Consultants/Specialty  psychiatry and Poison Control    Code Status  Full Code    Disposition  Patient is not medically cleared.   Anticipate discharge to to a psychiatric hospital.  I have placed the appropriate orders for post-discharge needs.    Review of Systems  Review of Systems   Constitutional: Positive for malaise/fatigue. Negative for chills and fever.   Respiratory: Negative for cough and shortness of breath.    Cardiovascular: Negative for chest pain and palpitations.   Gastrointestinal: Negative for abdominal pain, constipation, diarrhea, nausea and vomiting.   Musculoskeletal: Negative for back pain and joint pain.   Neurological: Positive for tremors. Negative for dizziness and headaches.   All other systems reviewed and are negative.     Physical Exam  Temp:  [36.3 °C (97.4 °F)-36.9 °C (98.5 °F)] 36.9 °C (98.5 °F)  Pulse:  [] 120  Resp:  [18-24] 24  BP: (115-134)/(77-92) 121/77  SpO2:  [96 %-98 %] 98 %    Physical Exam  Vitals and nursing note reviewed.   Constitutional:       General: She is not in acute distress.     Appearance: Normal appearance. She is not ill-appearing.   HENT:      Head: Normocephalic and atraumatic.   Eyes:      General: No scleral icterus.     Extraocular Movements: Extraocular movements intact.   Cardiovascular:      Rate and Rhythm: Regular rhythm. Tachycardia present.      Pulses: Normal pulses.      Heart sounds: Normal heart sounds.   Pulmonary:      Effort: Pulmonary effort is normal.      Breath sounds: Normal breath sounds.   Abdominal:      General: Abdomen is flat. Bowel sounds are normal.      Palpations: Abdomen is soft.   Musculoskeletal:         General: No swelling or tenderness. Normal range of motion.      Cervical back: Normal range of motion and neck supple.   Skin:     General: Skin is warm.      Coloration: Skin is not jaundiced.      Findings: No erythema.    Neurological:      General: No focal deficit present.      Mental Status: She is alert and oriented to person, place, and time. Mental status is at baseline.      Motor: Tremor present. No weakness.   Psychiatric:         Mood and Affect: Mood normal.         Behavior: Behavior normal.      Comments: Patient denies any suicidal or homicidal ideation         Fluids  No intake or output data in the 24 hours ending 07/23/21 1345    Laboratory      Recent Labs     07/21/21  0627 07/22/21  0134 07/23/21  0605   SODIUM 137 135 136   POTASSIUM 4.1 3.5* 3.8   CHLORIDE 105 101 101   CO2 22 17* 22   GLUCOSE 99 120* 101*   BUN 7* 8 9   CREATININE 0.59 0.62 0.52   CALCIUM 8.8 8.8 8.8     Recent Labs     07/21/21  0627 07/22/21  0134   APTT  --  24.7   INR 0.99 0.96               Imaging  MR-BRAIN-WITH & W/O    (Results Pending)   No imaging ordered on this admission at this time.    Assessment/Plan  * Tylenol overdose, intentional self-harm, initial encounter ([facility])- (present on admission)  Assessment & Plan  Reportedly took 40 tablets of Tylenol at 3 PM.  Tylenol level 313 --> 64  completed NAC per protocol  Legal hold on admission, extended by psychiatry team.  Improved, LFTs improved    Tylenol overdose treated and resolved.    Intentional self-harm still active  - Suicidal precaution, fall, aspiration, seizure precautions  - As per psychiatric services, patient will need inpatient psychiatric facility admission once she is medically cleared.    - At this time patient is not medically cleared, currently undergoing withdrawal from her multiple illicit drugs.  - Legal hold extended and court petition filed via [employer] 7/21    Involuntary commitment- (present on admission)  Assessment & Plan  Patient on legal hold for intentional self harm with tylenol overdose  Legal hold extended and sent via [employer] to court  Psychiatry following  Spoke with Dr. Iqbal, patient will continue on route to inpatient psychiatric facility on  discharge when medically cleared.    Alcohol withdrawal seizure with complication (HCC)  Assessment & Plan  Patient had seizure episode early morning 7/22. Required IV ativan. Patient back to baseline on morning evaluation.  Difficult to start Librium as patient had tylenol toxicity, high risk for liver failure.  Patient has had total of 4 withdrawal seizures. (7/22 - early morning, 2 in evening. 7/23 - 6am morning)  Reviewed EEG results, does not show epileptic form waves.  Neurology evaluated patient, determined seizures are from illicit drug and ETOH withdrawal, recommended EEG and Ativan, no AED yet.  - continue ativan 4mg IV for seizure  - continue seizure precautions  - neurology following patient  - Librium is out of stock, national shortage. May try Valium for longer benzo control for withdrawal.    Tobacco abuse- (present on admission)  Assessment & Plan  Tobacco cessation counseling and education provided for 4 minutes. Nicotine replacement options provided including patch, and further medical treatments including Wellbutrin and Chantix. As well as over the counter options of lozenges and gum.    Polysubstance abuse (HCC)- (present on admission)  Assessment & Plan  UDS positive for methamphetamine, opioids  We will plan to provide with illicit drug counseling when alcohol intoxication resolves.   7/20-patient counseled with nurse present in the room about illicit drug use cessation    Hypokalemia- (present on admission)  Assessment & Plan  Monitor and replace    Alcohol intoxication (HCC)- (present on admission)  Assessment & Plan  Supportive care  Monitor for withdrawal  Continue CIWA protocol, patient continues to require Ativan.  Patient's not medically cleared     VTE prophylaxis: enoxaparin ppx    I have performed a physical exam and reviewed and updated ROS and Plan today (7/23/2021). In review of yesterday's note (7/22/2021), there are no changes except as documented above.

## 2021-07-23 NOTE — CARE PLAN
The patient is Stable - Low risk of patient condition declining or worsening    Shift Goals  Clinical Goals: Safety, No seizure activity  Patient Goals: Rest  Family Goals: n/a    Progress made toward(s) clinical / shift goals:      Patient is not progressing towards the following goals:      Problem: Knowledge Deficit - Standard  Goal: Patient and family/care givers will demonstrate understanding of plan of care, disease process/condition, diagnostic tests and medications  Outcome: Progressing     Problem: Provide Safe Environment  Goal: Suicide environmental safety, protocols, policies, and practices will be implemented  Outcome: Progressing     Problem: Psychosocial  Goal: Patient's ability to identify and develop effective coping behaviors will improve  Outcome: Progressing  Goal: Patient's ability to identify and utilize available support systems will improve  Outcome: Progressing     Problem: Optimal Care for Alcohol Withdrawal  Goal: Optimal Care for the alcohol withdrawal patient  Outcome: Progressing     Problem: Seizure Precautions  Goal: Implementation of seizure precautions  Outcome: Progressing     Problem: Lifestyle Changes  Goal: Patient's ability to identify lifestyle changes and available resources to help reduce recurrence of condition will improve  Outcome: Progressing     Problem: Psychosocial  Goal: Patient's level of anxiety will decrease  Outcome: Progressing  Goal: Spiritual and cultural needs incorporated into hospitalization  Outcome: Progressing     Problem: Risk for Aspiration  Goal: Patient's risk for aspiration will be absent or decrease  Outcome: Progressing     Problem: Pain - Standard  Goal: Alleviation of pain or a reduction in pain to the patient’s comfort goal  Outcome: Progressing

## 2021-07-23 NOTE — PROGRESS NOTES
NEUROLOGY PROGRESS NOTE      BACKGROUND:   26 y.o. female was admitted on 7/18/2021  5:28 PM for Suicidal deliberate poisoning, initial encounter (HCC) [T65.92XA].  She is being followed by Neurology for new onset seizure    SUBJECTIVE:   Doing much better today, alert and fully oriented.  She tells me she was aware of her surrounding when she had to use seizure episode    VITALS:  Vitals:    07/22/21 2332 07/23/21 0306 07/23/21 0801 07/23/21 1230   BP: 121/91 126/78 115/77 121/77   Pulse: 92 95 97 (!) 120   Resp: 18 18 18 (!) 24   Temp: 36.4 °C (97.5 °F) 36.3 °C (97.4 °F) 36.8 °C (98.2 °F) 36.9 °C (98.5 °F)   TempSrc: Temporal Temporal Temporal Temporal   SpO2: 97% 96% 98% 98%   Weight:       Height:           NEUROLOGICAL EXAM:   Orientation to time, place, and person were normal.  Recent and remote memory were normal.  Attention span and concentration were normal.  Language (naming, repetition, spontaneous speech) was normal.  Fund of knowledge was normal for age and education.  All cranial nerves were normal: Pupils were equally round and reactive to light.  Motor: (tone, bulk and strength): 5/5 strength in both upper and lower extremity, proximal and distal - no abnormal movement noted.  Patient has normal muscle bulk and tone.  Sensation (all modalities) was normal  Reflexes were normal and symmetrical in both upper and lower extremities  Coordination (finger-to-nose, heel/knee/shin, rapid alternating movements) was normal. There was no ataxia, no tremors, and no dysmetria. Station and gait were normal    OBJECTIVE:    NEUROIMAGING:    MR-BRAIN-WITH & W/O    (Results Pending)       MEDICATIONS:  Current Facility-Administered Medications   Medication Dose Route Frequency Provider Last Rate Last Admin   • LORazepam (ATIVAN) injection 4 mg  4 mg Intravenous Q10 MIN PRN Los Ricardo M.D.   2 mg at 07/23/21 0640   • melatonin tablet 5 mg  5 mg Oral HS PRN Hannah Browne D.O.   5 mg at 07/21/21 2202   •  LORazepam (ATIVAN) tablet 0.5 mg  0.5 mg Oral Q4HRS PRN Phillip Godinez M.D.   0.5 mg at 07/21/21 1700   • LORazepam (ATIVAN) tablet 1 mg  1 mg Oral Q4HRS PRN Phillip Godinez M.D.   1 mg at 07/20/21 1448    Or   • LORazepam (ATIVAN) injection 0.5 mg  0.5 mg Intravenous Q4HRS PRN Phillip Godinez M.D.   0.5 mg at 07/22/21 0409   • LORazepam (ATIVAN) tablet 2 mg  2 mg Oral Q2HRS PRN Phillip Godinez M.D.   2 mg at 07/20/21 1655    Or   • LORazepam (ATIVAN) injection 1 mg  1 mg Intravenous Q2HRS PRN Phillip Godinez M.D.   1 mg at 07/19/21 2225   • nicotine (NICODERM) 14 MG/24HR 14 mg  14 mg Transdermal Daily-0600 Mark Roque D.OJose   14 mg at 07/23/21 0508    And   • nicotine polacrilex (NICORETTE) 2 MG piece 2 mg  2 mg Oral Q HOUR PRN Mark Roque D.OJose   2 mg at 07/23/21 1309   • senna-docusate (PERICOLACE or SENOKOT S) 8.6-50 MG per tablet 2 tablet  2 tablet Oral BID Rg Brian M.D.   2 tablet at 07/19/21 1630    And   • polyethylene glycol/lytes (MIRALAX) PACKET 1 Packet  1 Packet Oral QDAY PRN Rg Brian M.D.        And   • magnesium hydroxide (MILK OF MAGNESIA) suspension 30 mL  30 mL Oral QDAY PRN Rg Brian M.D.        And   • bisacodyl (DULCOLAX) suppository 10 mg  10 mg Rectal QDAY PRN Rg Brian M.D.       • Respiratory Therapy Consult   Nebulization Continuous RT Rg Brian M.D.       • enoxaparin (LOVENOX) inj 40 mg  40 mg Subcutaneous DAILY Rg Brian M.D.   40 mg at 07/23/21 0509   • cloNIDine (CATAPRES) tablet 0.1 mg  0.1 mg Oral Q6HRS PRN Rg Brian M.D.       • enalaprilat (VASOTEC) injection 1.25 mg  1.25 mg Intravenous Q6HRS PRN Rg Brian M.D.       • labetalol (NORMODYNE/TRANDATE) injection 10 mg  10 mg Intravenous Q4HRS PRN Rg Brian M.D.       • ondansetron (ZOFRAN) syringe/vial injection 4 mg  4 mg Intravenous Q4HRS PRN Rg Brian M.D.   4 mg at 07/19/21 9367   • promethazine (PHENERGAN) tablet 12.5-25 mg  12.5-25 mg Oral Q4HRS PRN  Rg Brian M.D.       • promethazine (PHENERGAN) suppository 12.5-25 mg  12.5-25 mg Rectal Q4HRS PRN Rg Brian M.D.       • prochlorperazine (COMPAZINE) injection 5-10 mg  5-10 mg Intravenous Q4HRS PRN Rg Brian M.D.       • haloperidol lactate (HALDOL) injection 2-5 mg  2-5 mg Intravenous Q4HRS PRN Rg Brian M.D.   5 mg at 07/23/21 1527   • famotidine (PEPCID) tablet 20 mg  20 mg Oral BID Rg Brian M.D.   20 mg at 07/23/21 0508       LABS:          Recent Labs     07/21/21  0627 07/22/21  0134 07/23/21  0605   SODIUM 137 135 136   POTASSIUM 4.1 3.5* 3.8   CHLORIDE 105 101 101   CO2 22 17* 22   GLUCOSE 99 120* 101*   BUN 7* 8 9     INR   Date Value Ref Range Status   07/22/2021 0.96 0.87 - 1.13 Final     Comment:     INR - Non-therapeutic Reference Range: 0.87-1.13  INR - Therapeutic Reference Range: 2.0-4.0       No results found for: POCINR  Lab Results   Component Value Date/Time    CREATININE 0.52 07/23/2021 0605     Lab Results   Component Value Date/Time    IFAFRICA >60 07/23/2021 0605    IFNOTAFR >60 07/23/2021 0605       ASSESSMENT AND PLAN:  26 y.o. female with history of depression who was admitted on 7/18/2021 due to overdose with Tylenol as a suicide attempt.  She also has history of polysubstance abuse and her UDS was positive for opiates, amphetamine and alcohol.  She was noted to have an episodes of generalized tonic-clonic seizures witnessed by her bedside RN.   She had no bowel or bladder incontinence and there is no tongue biting.  Her EEG is normal, brain MRI is pending. Her seizures are likely provoked by intoxication and or withdrawal from polysubstance she had used.  We will continue monitor her seizures and treat breakthrough seizures with Ativan.  At present time no indication for seizure medication.  Continue with seizure precaution.  And treat breakthrough seizure with Ativan.

## 2021-07-23 NOTE — CARE PLAN
The patient is Watcher - Medium risk of patient condition declining or worsening    Shift Goals  Clinical Goals: to have no more seizures; keep safe from injuries  Patient Goals: Rest  Family Goals: n/a    Progress made toward(s) clinical / shift goals:  Lexie does show interest in understanding her situation, and why she is having seizures. She did end up walking in hallways this afternoon, despite being educated about safety and recent seizures she's had.  After further discussion, she did agree to only walk around intermittently in her room, and did ask for writing pen and paper for her to use as part of her anxiety relief technique.  Her CIWA scores were 4 and 3 this morning, but 13 this afternoon, which required a dose of po ativan.  IV haldol given for anxiety.     Patient is not progressing towards the following goals: No seizures observed on day shift today, but Lexie did require one dose of prn IV haldol for anxiery, and one dose of po ativan for CIWA score of 13 this afternoon.        Problem: Knowledge Deficit - Standard  Goal: Patient and family/care givers will demonstrate understanding of plan of care, disease process/condition, diagnostic tests and medications  Outcome: Progressing     Problem: Provide Safe Environment  Goal: Suicide environmental safety, protocols, policies, and practices will be implemented  Outcome: Progressing     Problem: Psychosocial  Goal: Patient's ability to identify and develop effective coping behaviors will improve  Outcome: Progressing     Problem: Optimal Care for Alcohol Withdrawal  Goal: Optimal Care for the alcohol withdrawal patient  Outcome: Progressing     Problem: Seizure Precautions  Goal: Implementation of seizure precautions  Outcome: Progressing

## 2021-07-23 NOTE — PROGRESS NOTES
Patient experienced a seizure around 6:30 am, lasted a minute & 30seconds. Heart rate went to 140s but slowly came down as seizure came to an end. Patient was able to notify sitter when she felt the seizure coming on. 4 mg ativan ordered, 2mg ativan given. Seizure stopped after the 2mg was administered. Patient was able to open eyes when name was called once seizure was over but couldn't answer verbally.

## 2021-07-23 NOTE — PROGRESS NOTES
Telemetry monitoring:  SR/ST with rate between 69 - 135 bpm.    Was tachycardic during seizure, and post-ictal, with heart rate getting into 140s during seizure, then reducing to 120s after seizure was over.  Heart rate eventually returned to 90s while sleeping.      0.10/0.07/0.34

## 2021-07-23 NOTE — PROGRESS NOTES
Patient heart rhythm alternating between sinus rhythm and junctional. Patient sleeping. Woke patient to see if okay, no signs of chest pain or headache just some dizziness. Paged hospitalist on call to update.

## 2021-07-24 ENCOUNTER — APPOINTMENT (OUTPATIENT)
Dept: RADIOLOGY | Facility: MEDICAL CENTER | Age: 26
DRG: 918 | End: 2021-07-24
Attending: PSYCHIATRY & NEUROLOGY
Payer: MEDICAID

## 2021-07-24 LAB
APAP SERPL-MCNC: <5 UG/ML (ref 10–30)
APAP SERPL-MCNC: <5 UG/ML (ref 10–30)

## 2021-07-24 PROCEDURE — 700102 HCHG RX REV CODE 250 W/ 637 OVERRIDE(OP): Performed by: STUDENT IN AN ORGANIZED HEALTH CARE EDUCATION/TRAINING PROGRAM

## 2021-07-24 PROCEDURE — 700111 HCHG RX REV CODE 636 W/ 250 OVERRIDE (IP): Performed by: INTERNAL MEDICINE

## 2021-07-24 PROCEDURE — 80143 DRUG ASSAY ACETAMINOPHEN: CPT | Mod: 91

## 2021-07-24 PROCEDURE — 700102 HCHG RX REV CODE 250 W/ 637 OVERRIDE(OP): Performed by: INTERNAL MEDICINE

## 2021-07-24 PROCEDURE — A9576 INJ PROHANCE MULTIPACK: HCPCS | Performed by: PSYCHIATRY & NEUROLOGY

## 2021-07-24 PROCEDURE — 70553 MRI BRAIN STEM W/O & W/DYE: CPT

## 2021-07-24 PROCEDURE — A9270 NON-COVERED ITEM OR SERVICE: HCPCS | Performed by: STUDENT IN AN ORGANIZED HEALTH CARE EDUCATION/TRAINING PROGRAM

## 2021-07-24 PROCEDURE — 99232 SBSQ HOSP IP/OBS MODERATE 35: CPT | Performed by: INTERNAL MEDICINE

## 2021-07-24 PROCEDURE — A9270 NON-COVERED ITEM OR SERVICE: HCPCS | Performed by: INTERNAL MEDICINE

## 2021-07-24 PROCEDURE — 700117 HCHG RX CONTRAST REV CODE 255: Performed by: PSYCHIATRY & NEUROLOGY

## 2021-07-24 PROCEDURE — 770020 HCHG ROOM/CARE - TELE (206)

## 2021-07-24 PROCEDURE — 36415 COLL VENOUS BLD VENIPUNCTURE: CPT

## 2021-07-24 RX ADMIN — LORAZEPAM 0.5 MG: 1 TABLET ORAL at 15:23

## 2021-07-24 RX ADMIN — NICOTINE 14 MG: 14 PATCH TRANSDERMAL at 04:27

## 2021-07-24 RX ADMIN — ENOXAPARIN SODIUM 40 MG: 40 INJECTION SUBCUTANEOUS at 04:28

## 2021-07-24 RX ADMIN — Medication 5 MG: at 21:09

## 2021-07-24 RX ADMIN — GADOTERIDOL 13 ML: 279.3 INJECTION, SOLUTION INTRAVENOUS at 10:55

## 2021-07-24 RX ADMIN — LORAZEPAM 0.5 MG: 1 TABLET ORAL at 05:17

## 2021-07-24 RX ADMIN — LORAZEPAM 2 MG: 2 TABLET ORAL at 09:51

## 2021-07-24 RX ADMIN — FAMOTIDINE 20 MG: 20 TABLET ORAL at 16:51

## 2021-07-24 RX ADMIN — LORAZEPAM 0.5 MG: 1 TABLET ORAL at 19:40

## 2021-07-24 RX ADMIN — FAMOTIDINE 20 MG: 20 TABLET ORAL at 04:28

## 2021-07-24 ASSESSMENT — LIFESTYLE VARIABLES
TREMOR: NO TREMOR
ORIENTATION AND CLOUDING OF SENSORIUM: ORIENTED AND CAN DO SERIAL ADDITIONS
NAUSEA AND VOMITING: NO NAUSEA AND NO VOMITING
TREMOR: TREMOR NOT VISIBLE BUT CAN BE FELT, FINGERTIP TO FINGERTIP
ORIENTATION AND CLOUDING OF SENSORIUM: ORIENTED AND CAN DO SERIAL ADDITIONS
ANXIETY: MODERATELY ANXIOUS OR GUARDED, SO ANXIETY IS INFERRED
VISUAL DISTURBANCES: NOT PRESENT
ORIENTATION AND CLOUDING OF SENSORIUM: ORIENTED AND CAN DO SERIAL ADDITIONS
TOTAL SCORE: 2
HEADACHE, FULLNESS IN HEAD: NOT PRESENT
TREMOR: NO TREMOR
AUDITORY DISTURBANCES: NOT PRESENT
HEADACHE, FULLNESS IN HEAD: NOT PRESENT
ANXIETY: *
NAUSEA AND VOMITING: NO NAUSEA AND NO VOMITING
TOTAL SCORE: 6
AGITATION: NORMAL ACTIVITY
ANXIETY: MODERATELY ANXIOUS OR GUARDED, SO ANXIETY IS INFERRED
AUDITORY DISTURBANCES: NOT PRESENT
HEADACHE, FULLNESS IN HEAD: NOT PRESENT
VISUAL DISTURBANCES: NOT PRESENT
TREMOR: NO TREMOR
ORIENTATION AND CLOUDING OF SENSORIUM: ORIENTED AND CAN DO SERIAL ADDITIONS
TOTAL SCORE: 6
VISUAL DISTURBANCES: NOT PRESENT
AUDITORY DISTURBANCES: NOT PRESENT
ORIENTATION AND CLOUDING OF SENSORIUM: ORIENTED AND CAN DO SERIAL ADDITIONS
VISUAL DISTURBANCES: NOT PRESENT
ANXIETY: MODERATELY ANXIOUS OR GUARDED, SO ANXIETY IS INFERRED
HEADACHE, FULLNESS IN HEAD: NOT PRESENT
TREMOR: NO TREMOR
NAUSEA AND VOMITING: NO NAUSEA AND NO VOMITING
TOTAL SCORE: 6
ORIENTATION AND CLOUDING OF SENSORIUM: ORIENTED AND CAN DO SERIAL ADDITIONS
AGITATION: NORMAL ACTIVITY
ANXIETY: *
AGITATION: NORMAL ACTIVITY
TOTAL SCORE: 1
PAROXYSMAL SWEATS: BARELY PERCEPTIBLE SWEATING. PALMS MOIST
AUDITORY DISTURBANCES: NOT PRESENT
HEADACHE, FULLNESS IN HEAD: VERY MILD
AGITATION: NORMAL ACTIVITY
NAUSEA AND VOMITING: NO NAUSEA AND NO VOMITING
TOTAL SCORE: 6
AUDITORY DISTURBANCES: NOT PRESENT
TOTAL SCORE: VERY MILD ITCHING, PINS AND NEEDLES SENSATION, BURNING OR NUMBNESS
AGITATION: NORMAL ACTIVITY
PAROXYSMAL SWEATS: BARELY PERCEPTIBLE SWEATING. PALMS MOIST
PAROXYSMAL SWEATS: NO SWEAT VISIBLE
ANXIETY: MILDLY ANXIOUS
NAUSEA AND VOMITING: NO NAUSEA AND NO VOMITING
PAROXYSMAL SWEATS: BARELY PERCEPTIBLE SWEATING. PALMS MOIST
AUDITORY DISTURBANCES: NOT PRESENT
HEADACHE, FULLNESS IN HEAD: NOT PRESENT
NAUSEA AND VOMITING: NO NAUSEA AND NO VOMITING
TREMOR: TREMOR NOT VISIBLE BUT CAN BE FELT, FINGERTIP TO FINGERTIP
AGITATION: NORMAL ACTIVITY
PAROXYSMAL SWEATS: NO SWEAT VISIBLE
VISUAL DISTURBANCES: NOT PRESENT
VISUAL DISTURBANCES: NOT PRESENT
PAROXYSMAL SWEATS: NO SWEAT VISIBLE

## 2021-07-24 ASSESSMENT — ENCOUNTER SYMPTOMS
HEADACHES: 0
COUGH: 0
BACK PAIN: 0
PALPITATIONS: 0
FEVER: 0
ABDOMINAL PAIN: 0
CONSTIPATION: 0
HALLUCINATIONS: 0
SHORTNESS OF BREATH: 0
NAUSEA: 0
VOMITING: 0
TREMORS: 0
DIARRHEA: 0
CHILLS: 0
DIZZINESS: 0

## 2021-07-24 NOTE — CARE PLAN
Problem: Knowledge Deficit - Standard  Goal: Patient and family/care givers will demonstrate understanding of plan of care, disease process/condition, diagnostic tests and medications  Outcome: Progressing     Problem: Provide Safe Environment  Goal: Suicide environmental safety, protocols, policies, and practices will be implemented  Outcome: Progressing     Problem: Seizure Precautions  Goal: Implementation of seizure precautions  Outcome: Progressing     The patient is Stable - Low risk of patient condition declining or worsening    Shift Goals  Clinical Goals: keep safe from injury   Patient Goals: Rest  Family Goals: n/a    Progress made toward(s) clinical / shift goals:  keep safe, monitor CIWA score    Patient is not progressing towards the following goals:    Received report at 1900 from Kenyatta HATFIELD. Patient admitted on 7/18/21 for S.I attempt. Patient A&O x4. Activity: standby assist Skin: left wrist wound Pain: 0/10. Breathing even and non labored on room air. Sitter at bedside, CIWA scores taken, patient refused nicotine patch to be taken off from left arm. Tele: SR/ST. Will continue to monitor

## 2021-07-24 NOTE — PROGRESS NOTES
Telemetry monitoring:  SR/SR with rate between 86 (while sleeping) - 130 (with activity) bpm; occasional PVCs.        0.12/0.06/0.30

## 2021-07-24 NOTE — PROGRESS NOTES
Assumed care of patient this am; AOx4. Patient denies SOB and pain at this time. Bed low and locked. Discussed plan of no self harm with patient. 1:1 sitter at bedside at all times. Will continue to monitor.
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
## 2021-07-24 NOTE — PROGRESS NOTES
Hospital Medicine Daily Progress Note    Date of Service  7/24/2021    Chief Complaint  Lexie Rush is a 26 y.o. female admitted 7/18/2021 with Tylenol overdose    Hospital Course  26-year-old female with a past medical history of polysubstance use disorder including methamphetamines, heroin, alcohol admitted on 7/18/2021 with Tylenol overdose, took 40+ tablets, level 313 then 328 on admission.  Patient was positive on UDS for methamphetamines, opioids/heroin, alcohol level elevated 183.8.  On admission patient received N-acetylcysteine, detox bag and placed on CIWA protocol.  Poison control was called.  Pregnancy test was negative. Patient did cut her wrist 3 days prior to admission, as per HPI.  Involuntary hold paperwork started in ED.    7/20 - patient legal hold extended. Psychiatry team requesting patient dc to inpatient psychiatric hospital when medically cleared.  7/21 - completed NAC treatments, acetaminophen levels remain undetectable, LFT normalized.  7/22 - patient had total 3 seizures (1 early morning, 2 afternoon).   Neurology evaluated patient, determined seizures are from illicit drug and ETOH withdrawal, recommended EEG and Ativan, no AED yet.    Interval Problem Update  Patient seen in her room with sitadia and nurse Alexei.  Patient stated again that she wants to do outpatient psychiatry but had to explain to her again the reasonings for inpatient psychiatric facility admission once she is medically cleared.  Patient has not had a seizure today, potentially out of withdrawal.   I have personally seen and examined the patient at bedside. I discussed the plan of care with patient, bedside RN, charge RN,  and pharmacy.    Consultants/Specialty   psychiatry and Poison Control    Code Status  Full Code    Disposition  Patient is not medically cleared.   Anticipate discharge to to a psychiatric hospital.  I have placed the appropriate orders for post-discharge needs.    Review of  Systems  Review of Systems   Constitutional: Negative for chills, fever and malaise/fatigue.   Respiratory: Negative for cough and shortness of breath.    Cardiovascular: Negative for chest pain and palpitations.   Gastrointestinal: Negative for abdominal pain, constipation, diarrhea, nausea and vomiting.   Musculoskeletal: Negative for back pain and joint pain.   Neurological: Negative for dizziness, tremors and headaches.   Psychiatric/Behavioral: Negative for hallucinations and suicidal ideas.   All other systems reviewed and are negative.     Physical Exam  Temp:  [36.4 °C (97.5 °F)-37.1 °C (98.8 °F)] 36.6 °C (97.8 °F)  Pulse:  [] 112  Resp:  [16-18] 18  BP: (113-126)/(67-81) 117/81  SpO2:  [95 %-99 %] 99 %    Physical Exam  Vitals and nursing note reviewed.   Constitutional:       General: She is not in acute distress.     Appearance: Normal appearance. She is not ill-appearing.   HENT:      Head: Normocephalic and atraumatic.   Musculoskeletal:         General: No swelling or tenderness. Normal range of motion.      Cervical back: Normal range of motion and neck supple.   Skin:     General: Skin is warm.      Coloration: Skin is not jaundiced.      Findings: No erythema.   Neurological:      General: No focal deficit present.      Mental Status: She is alert and oriented to person, place, and time. Mental status is at baseline.      Motor: Tremor present. No weakness.   Psychiatric:         Mood and Affect: Mood normal.         Behavior: Behavior normal.      Comments: Patient denies any suicidal or homicidal ideation         Fluids    Intake/Output Summary (Last 24 hours) at 7/24/2021 1350  Last data filed at 7/24/2021 0400  Gross per 24 hour   Intake 840 ml   Output --   Net 840 ml       Laboratory      Recent Labs     07/22/21  0134 07/23/21  0605   SODIUM 135 136   POTASSIUM 3.5* 3.8   CHLORIDE 101 101   CO2 17* 22   GLUCOSE 120* 101*   BUN 8 9   CREATININE 0.62 0.52   CALCIUM 8.8 8.8     Recent Labs      07/22/21  0134   APTT 24.7   INR 0.96               Imaging  MR-BRAIN-WITH & W/O   Final Result      Unremarkable MRI of the brain with and without contrast enhancement.      No imaging ordered on this admission at this time.    Assessment/Plan  * Tylenol overdose, intentional self-harm, initial encounter (HCC)- (present on admission)  Assessment & Plan  Reportedly took 40 tablets of Tylenol at 3 PM.  Tylenol level 313 --> 64  completed NAC per protocol  Legal hold on admission, extended by psychiatry team.  Improved, LFTs improved    Tylenol overdose treated and resolved.    Intentional self-harm still active  - Suicidal precaution, fall, aspiration, seizure precautions  - As per psychiatric services, patient will need inpatient psychiatric facility admission once she is medically cleared.    - At this time patient is not medically cleared, currently undergoing withdrawal from her multiple illicit drugs.  - Legal hold extended and court petition filed via [employer] 7/21    Involuntary commitment- (present on admission)  Assessment & Plan  Patient on legal hold for intentional self harm with tylenol overdose  Legal hold extended and sent via [employer] to court  Psychiatry following  Spoke with Dr. Iqbal, patient will continue on route to inpatient psychiatric facility on discharge when medically cleared.    Alcohol withdrawal seizure with complication (HCC)  Assessment & Plan  Patient had seizure episode early morning 7/22. Required IV ativan. Patient back to baseline on morning evaluation.  Difficult to start Librium as patient had tylenol toxicity, high risk for liver failure.  Patient has had total of 4 withdrawal seizures. (7/22 - early morning, 2 in evening. 7/23 - 6am morning)  Reviewed EEG results, does not show epileptic form waves.  Neurology evaluated patient, determined seizures are from illicit drug and ETOH withdrawal, recommended EEG and Ativan, no AED yet.  - continue ativan 4mg IV for seizure  - continue  seizure precautions  - neurology following patient  - Librium is out of stock, national shortage. If needed, valium is option.    Tobacco abuse- (present on admission)  Assessment & Plan  Tobacco cessation counseling and education provided for 4 minutes. Nicotine replacement options provided including patch, and further medical treatments including Wellbutrin and Chantix. As well as over the counter options of lozenges and gum.    Polysubstance abuse (HCC)- (present on admission)  Assessment & Plan  UDS positive for methamphetamine, opioids  We will plan to provide with illicit drug counseling when alcohol intoxication resolves.   7/20-patient counseled with nurse present in the room about illicit drug use cessation    Hypokalemia- (present on admission)  Assessment & Plan  Monitor and replace    Alcohol intoxication (HCC)- (present on admission)  Assessment & Plan  Supportive care  Monitor for withdrawal  Continue CIWA protocol, patient continues to require Ativan.  Patient's not medically cleared     VTE prophylaxis: enoxaparin ppx    I have performed a physical exam and reviewed and updated ROS and Plan today (7/24/2021). In review of yesterday's note (7/23/2021), there are no changes except as documented above.

## 2021-07-25 LAB
APAP SERPL-MCNC: <5 UG/ML (ref 10–30)
APAP SERPL-MCNC: <5 UG/ML (ref 10–30)

## 2021-07-25 PROCEDURE — 36415 COLL VENOUS BLD VENIPUNCTURE: CPT

## 2021-07-25 PROCEDURE — 700102 HCHG RX REV CODE 250 W/ 637 OVERRIDE(OP): Performed by: INTERNAL MEDICINE

## 2021-07-25 PROCEDURE — 700111 HCHG RX REV CODE 636 W/ 250 OVERRIDE (IP): Performed by: INTERNAL MEDICINE

## 2021-07-25 PROCEDURE — 770020 HCHG ROOM/CARE - TELE (206)

## 2021-07-25 PROCEDURE — 700102 HCHG RX REV CODE 250 W/ 637 OVERRIDE(OP): Performed by: STUDENT IN AN ORGANIZED HEALTH CARE EDUCATION/TRAINING PROGRAM

## 2021-07-25 PROCEDURE — 80143 DRUG ASSAY ACETAMINOPHEN: CPT | Mod: 91

## 2021-07-25 PROCEDURE — A9270 NON-COVERED ITEM OR SERVICE: HCPCS | Performed by: STUDENT IN AN ORGANIZED HEALTH CARE EDUCATION/TRAINING PROGRAM

## 2021-07-25 PROCEDURE — A9270 NON-COVERED ITEM OR SERVICE: HCPCS | Performed by: INTERNAL MEDICINE

## 2021-07-25 PROCEDURE — 99232 SBSQ HOSP IP/OBS MODERATE 35: CPT | Performed by: INTERNAL MEDICINE

## 2021-07-25 RX ADMIN — ENOXAPARIN SODIUM 40 MG: 40 INJECTION SUBCUTANEOUS at 04:39

## 2021-07-25 RX ADMIN — NICOTINE POLACRILEX 2 MG: 2 GUM, CHEWING BUCCAL at 10:51

## 2021-07-25 RX ADMIN — HALOPERIDOL LACTATE 5 MG: 5 INJECTION, SOLUTION INTRAMUSCULAR at 04:47

## 2021-07-25 RX ADMIN — HALOPERIDOL LACTATE 5 MG: 5 INJECTION, SOLUTION INTRAMUSCULAR at 14:27

## 2021-07-25 RX ADMIN — NICOTINE 14 MG: 14 PATCH TRANSDERMAL at 04:39

## 2021-07-25 RX ADMIN — LORAZEPAM 0.5 MG: 1 TABLET ORAL at 14:24

## 2021-07-25 RX ADMIN — FAMOTIDINE 20 MG: 20 TABLET ORAL at 17:18

## 2021-07-25 RX ADMIN — LORAZEPAM 0.5 MG: 1 TABLET ORAL at 09:12

## 2021-07-25 RX ADMIN — FAMOTIDINE 20 MG: 20 TABLET ORAL at 04:39

## 2021-07-25 RX ADMIN — LORAZEPAM 1 MG: 1 TABLET ORAL at 17:21

## 2021-07-25 RX ADMIN — HALOPERIDOL LACTATE 3 MG: 5 INJECTION, SOLUTION INTRAMUSCULAR at 18:34

## 2021-07-25 ASSESSMENT — ENCOUNTER SYMPTOMS
TREMORS: 0
ABDOMINAL PAIN: 0
HALLUCINATIONS: 0
NERVOUS/ANXIOUS: 1
DIAPHORESIS: 0
MYALGIAS: 0
SEIZURES: 0
ORTHOPNEA: 0
NECK PAIN: 0
DIARRHEA: 0
HEADACHES: 0
FEVER: 0

## 2021-07-25 ASSESSMENT — LIFESTYLE VARIABLES
VISUAL DISTURBANCES: NOT PRESENT
ORIENTATION AND CLOUDING OF SENSORIUM: ORIENTED AND CAN DO SERIAL ADDITIONS
TOTAL SCORE: 2
TREMOR: NO TREMOR
AUDITORY DISTURBANCES: NOT PRESENT
VISUAL DISTURBANCES: NOT PRESENT
HEADACHE, FULLNESS IN HEAD: NOT PRESENT
HEADACHE, FULLNESS IN HEAD: NOT PRESENT
ANXIETY: *
NAUSEA AND VOMITING: NO NAUSEA AND NO VOMITING
VISUAL DISTURBANCES: NOT PRESENT
ANXIETY: *
TREMOR: NO TREMOR
AGITATION: NORMAL ACTIVITY
HEADACHE, FULLNESS IN HEAD: NOT PRESENT
VISUAL DISTURBANCES: NOT PRESENT
TREMOR: NO TREMOR
NAUSEA AND VOMITING: NO NAUSEA AND NO VOMITING
NAUSEA AND VOMITING: NO NAUSEA AND NO VOMITING
ORIENTATION AND CLOUDING OF SENSORIUM: ORIENTED AND CAN DO SERIAL ADDITIONS
NAUSEA AND VOMITING: NO NAUSEA AND NO VOMITING
TOTAL SCORE: VERY MILD ITCHING, PINS AND NEEDLES SENSATION, BURNING OR NUMBNESS
TREMOR: NO TREMOR
TOTAL SCORE: 8
NAUSEA AND VOMITING: NO NAUSEA AND NO VOMITING
PAROXYSMAL SWEATS: NO SWEAT VISIBLE
HEADACHE, FULLNESS IN HEAD: NOT PRESENT
TOTAL SCORE: 6
TOTAL SCORE: 9
AUDITORY DISTURBANCES: NOT PRESENT
AUDITORY DISTURBANCES: NOT PRESENT
TOTAL SCORE: MILD ITCHING, PINS AND NEEDLES SENSATION, BURNING OR NUMBNESS
ANXIETY: *
TOTAL SCORE: 3
PAROXYSMAL SWEATS: NO SWEAT VISIBLE
TOTAL SCORE: 0
AGITATION: NORMAL ACTIVITY
VISUAL DISTURBANCES: NOT PRESENT
ANXIETY: MILDLY ANXIOUS
ORIENTATION AND CLOUDING OF SENSORIUM: ORIENTED AND CAN DO SERIAL ADDITIONS
AUDITORY DISTURBANCES: NOT PRESENT
TREMOR: NO TREMOR
ANXIETY: MODERATELY ANXIOUS OR GUARDED, SO ANXIETY IS INFERRED
AGITATION: NORMAL ACTIVITY
PAROXYSMAL SWEATS: NO SWEAT VISIBLE
VISUAL DISTURBANCES: NOT PRESENT
ORIENTATION AND CLOUDING OF SENSORIUM: ORIENTED AND CAN DO SERIAL ADDITIONS
PAROXYSMAL SWEATS: NO SWEAT VISIBLE
ANXIETY: *
ORIENTATION AND CLOUDING OF SENSORIUM: ORIENTED AND CAN DO SERIAL ADDITIONS
AGITATION: NORMAL ACTIVITY
ANXIETY: NO ANXIETY (AT EASE)
TOTAL SCORE: 4
ORIENTATION AND CLOUDING OF SENSORIUM: ORIENTED AND CAN DO SERIAL ADDITIONS
AUDITORY DISTURBANCES: NOT PRESENT
PAROXYSMAL SWEATS: NO SWEAT VISIBLE
NAUSEA AND VOMITING: NO NAUSEA AND NO VOMITING
PAROXYSMAL SWEATS: NO SWEAT VISIBLE
PAROXYSMAL SWEATS: NO SWEAT VISIBLE
HEADACHE, FULLNESS IN HEAD: NOT PRESENT
AGITATION: NORMAL ACTIVITY
ORIENTATION AND CLOUDING OF SENSORIUM: ORIENTED AND CAN DO SERIAL ADDITIONS
HEADACHE, FULLNESS IN HEAD: NOT PRESENT
AGITATION: NORMAL ACTIVITY
TREMOR: NO TREMOR
HEADACHE, FULLNESS IN HEAD: NOT PRESENT
TOTAL SCORE: MODERATE ITCHING, PINS AND NEEDLES SENSATION, BURNING OR NUMBNESS
AUDITORY DISTURBANCES: NOT PRESENT
HEADACHE, FULLNESS IN HEAD: NOT PRESENT
NAUSEA AND VOMITING: NO NAUSEA AND NO VOMITING
AGITATION: NORMAL ACTIVITY
VISUAL DISTURBANCES: NOT PRESENT
ORIENTATION AND CLOUDING OF SENSORIUM: ORIENTED AND CAN DO SERIAL ADDITIONS
AGITATION: NORMAL ACTIVITY
ANXIETY: *
AUDITORY DISTURBANCES: NOT PRESENT
NAUSEA AND VOMITING: NO NAUSEA AND NO VOMITING
VISUAL DISTURBANCES: NOT PRESENT
PAROXYSMAL SWEATS: NO SWEAT VISIBLE
AUDITORY DISTURBANCES: NOT PRESENT
TOTAL SCORE: 1

## 2021-07-25 ASSESSMENT — FIBROSIS 4 INDEX: FIB4 SCORE: 0.5

## 2021-07-25 NOTE — PROGRESS NOTES
Hospital Medicine Daily Progress Note    Date of Service  7/25/2021    Chief Complaint  Lexie Rush is a 26 y.o. female admitted 7/18/2021 with Tylenol overdose    Hospital Course  26-year-old female with a past medical history of polysubstance use disorder including methamphetamines, heroin, alcohol admitted on 7/18/2021 with Tylenol overdose, took 40+ tablets, level 313 then 328 on admission.  Patient was positive on UDS for methamphetamines, opioids/heroin, alcohol level elevated 183.8.  On admission patient received N-acetylcysteine, detox bag and placed on CIWA protocol.  Poison control was called.  Pregnancy test was negative. Patient did cut her wrist 3 days prior to admission, as per HPI.  Involuntary hold paperwork started in ED.    7/20 - patient legal hold extended. Psychiatry team requesting patient dc to inpatient psychiatric hospital when medically cleared.  7/21 - completed NAC treatments, acetaminophen levels remain undetectable, LFT normalized.  7/22 - patient had total 3 seizures (1 early morning, 2 afternoon).   Neurology evaluated patient, determined seizures are from illicit drug and ETOH withdrawal, recommended EEG and Ativan, no AED yet.  7/25 - medically cleared, no further seizures    Interval Problem Update  Patient seen in her room with sitter. Patient stated she was doing better today.  Patient has not had a seizure today and yesterday, potentially out of withdrawal.   Signed paperwork for medical clearance on legal hold paperwork    I have personally seen and examined the patient at bedside. I discussed the plan of care with patient, bedside RN, charge RN,  and pharmacy.    Consultants/Specialty   psychiatry and Poison Control    Code Status  Full Code    Disposition  Patient is medically cleared pending inpatient psychiatry facility.   Anticipate discharge to to a psychiatric hospital.  I have placed the appropriate orders for post-discharge needs.    Review of  Systems  Review of Systems   Constitutional: Negative for diaphoresis and fever.   Cardiovascular: Negative for orthopnea and leg swelling.   Gastrointestinal: Negative for abdominal pain and diarrhea.   Musculoskeletal: Negative for myalgias and neck pain.   Neurological: Negative for tremors, seizures and headaches.   Psychiatric/Behavioral: Negative for hallucinations and suicidal ideas. The patient is nervous/anxious.    All other systems reviewed and are negative.     Physical Exam  Temp:  [36.3 °C (97.3 °F)-37.1 °C (98.7 °F)] 36.3 °C (97.3 °F)  Pulse:  [] 95  Resp:  [16-20] 18  BP: (107-127)/(63-88) 116/63  SpO2:  [95 %-100 %] 97 %    Physical Exam  Vitals and nursing note reviewed.   Constitutional:       General: She is not in acute distress.     Appearance: Normal appearance. She is not ill-appearing.   HENT:      Head: Normocephalic and atraumatic.   Musculoskeletal:         General: No swelling or tenderness. Normal range of motion.      Cervical back: Normal range of motion and neck supple.   Skin:     General: Skin is warm.      Coloration: Skin is not jaundiced.      Findings: No erythema.   Neurological:      General: No focal deficit present.      Mental Status: She is alert and oriented to person, place, and time. Mental status is at baseline.      Motor: Tremor present. No weakness.   Psychiatric:         Mood and Affect: Mood normal.         Behavior: Behavior normal.      Comments: Patient denies any suicidal or homicidal ideation       Fluids    Intake/Output Summary (Last 24 hours) at 7/25/2021 1452  Last data filed at 7/25/2021 0500  Gross per 24 hour   Intake 440 ml   Output --   Net 440 ml       Laboratory      Recent Labs     07/23/21  0605   SODIUM 136   POTASSIUM 3.8   CHLORIDE 101   CO2 22   GLUCOSE 101*   BUN 9   CREATININE 0.52   CALCIUM 8.8                   Imaging  MR-BRAIN-WITH & W/O   Final Result      Unremarkable MRI of the brain with and without contrast enhancement.       No imaging ordered on this admission at this time.    Assessment/Plan  * Tylenol overdose, intentional self-harm, initial encounter (HCC)- (present on admission)  Assessment & Plan  Reportedly took 40 tablets of Tylenol at 3 PM.  Tylenol level 313 --> 64  completed NAC per protocol  Legal hold on admission, extended by psychiatry team.  Improved, LFTs improved    Tylenol overdose treated and resolved.    Intentional self-harm still active  - Suicidal precaution, fall, aspiration, seizure precautions  - As per psychiatric services, patient will need inpatient psychiatric facility admission once she is medically cleared.    - At this time patient is not medically cleared, currently undergoing withdrawal from her multiple illicit drugs.  - Legal hold extended and court petition filed via [employer] 7/21    Involuntary commitment- (present on admission)  Assessment & Plan  Patient on legal hold for intentional self harm with tylenol overdose  Legal hold extended and sent via [employer] to court  Psychiatry following.  Spoke with Dr. Iqbal, patient will continue on route to inpatient psychiatric facility on discharge when medically cleared.    Alcohol withdrawal seizure with complication (HCC)  Assessment & Plan  Resolved.  No seizure for 2 days now, medically cleared.    Patient had seizure episode early morning 7/22. Required IV ativan. Patient back to baseline on morning evaluation.  Difficult to start Librium as patient had tylenol toxicity, high risk for liver failure.  Patient has had total of 4 withdrawal seizures. (7/22 - early morning, 2 in evening. 7/23 - 6am morning)  Reviewed EEG results, does not show epileptic form waves.  Neurology evaluated patient, determined seizures are from illicit drug and ETOH withdrawal, recommended EEG and Ativan, no AED yet.  - continue ativan 4mg IV for seizure PRN  - continue seizure precautions    Tobacco abuse- (present on admission)  Assessment & Plan  Tobacco cessation counseling  and education provided for 4 minutes. Nicotine replacement options provided including patch, and further medical treatments including Wellbutrin and Chantix. As well as over the counter options of lozenges and gum.    Polysubstance abuse (HCC)- (present on admission)  Assessment & Plan  UDS positive for methamphetamine, opioids  We will plan to provide with illicit drug counseling when alcohol intoxication resolves.   7/20-patient counseled with nurse present in the room about illicit drug use cessation    Hypokalemia- (present on admission)  Assessment & Plan  Monitor and replace    Alcohol intoxication (HCC)- (present on admission)  Assessment & Plan  Supportive care  Monitor for withdrawal  Continue CIWA protocol, patient continues to require Ativan.  2 days without seizure, medically cleared now     VTE prophylaxis: enoxaparin ppx    I have performed a physical exam and reviewed and updated ROS and Plan today (7/25/2021). In review of yesterday's note (7/24/2021), there are no changes except as documented above.

## 2021-07-25 NOTE — CARE PLAN
The patient is Watcher - Medium risk of patient condition declining or worsening    Shift Goals  Clinical Goals: remain free of seizures, injuries  Patient Goals: to go home, to walk in halls  Family Goals: n/a    Progress made toward(s) clinical / shift goals:  Lexie has not had any seizures today (last seizure noted to be early morning on 7/23).  She does report feeling anxious all day, and prn haldol was given.  She has received dose of prn po ativan today for CIWA scores of 9 this morning and 6 this afternoon.  She did require education this morning through discussion regarding her safety from harm of seizures while here in the hospital; MD gave ok to have her walk to garden outside, with RN escort.    Patient is not progressing towards the following goals:  Lexie continues to report feeling anxious, and needed dose of prn haldol today.  She has also needed some doses of prn ativan for CIWA scores today.      Problem: Psychosocial  Goal: Patient's level of anxiety will decrease  Outcome: Not Progressing         Problem: Knowledge Deficit - Standard  Goal: Patient and family/care givers will demonstrate understanding of plan of care, disease process/condition, diagnostic tests and medications  Outcome: Progressing     Problem: Provide Safe Environment  Goal: Suicide environmental safety, protocols, policies, and practices will be implemented  Outcome: Progressing     Problem: Psychosocial  Goal: Patient's ability to identify and develop effective coping behaviors will improve  Outcome: Progressing     Problem: Optimal Care for Alcohol Withdrawal  Goal: Optimal Care for the alcohol withdrawal patient  Outcome: Progressing

## 2021-07-25 NOTE — CARE PLAN
Problem: Knowledge Deficit - Standard  Goal: Patient and family/care givers will demonstrate understanding of plan of care, disease process/condition, diagnostic tests and medications  Outcome: Progressing     Problem: Provide Safe Environment  Goal: Suicide environmental safety, protocols, policies, and practices will be implemented  Outcome: Progressing     The patient is Stable - Low risk of patient condition declining or worsening    Shift Goals  Clinical Goals: no self harm   Patient Goals: walk  Family Goals: na    Progress made toward(s) clinical / shift goals:  keep safe. Monitor ciwa score     Patient is not progressing towards the following goals:    Received report at 1900 from Alexei HATFIELD. Patient admitted on 7/18/21 for S.I attempt. Patient A&O x4. Activity: standby assist Skin: left wrist wound Pain: 0/10. Breathing even and non labored on room air. Sitter at bedside, CIWA scores taken Q4, requiring 0.5 mg of ativan at times.Tele: SR/ST. Will continue to monitor

## 2021-07-26 LAB — APAP SERPL-MCNC: <5 UG/ML (ref 10–30)

## 2021-07-26 PROCEDURE — 700102 HCHG RX REV CODE 250 W/ 637 OVERRIDE(OP): Performed by: INTERNAL MEDICINE

## 2021-07-26 PROCEDURE — 99254 IP/OBS CNSLTJ NEW/EST MOD 60: CPT | Mod: GC | Performed by: PSYCHIATRY & NEUROLOGY

## 2021-07-26 PROCEDURE — 36415 COLL VENOUS BLD VENIPUNCTURE: CPT

## 2021-07-26 PROCEDURE — A9270 NON-COVERED ITEM OR SERVICE: HCPCS | Performed by: INTERNAL MEDICINE

## 2021-07-26 PROCEDURE — 99232 SBSQ HOSP IP/OBS MODERATE 35: CPT | Performed by: INTERNAL MEDICINE

## 2021-07-26 PROCEDURE — 700102 HCHG RX REV CODE 250 W/ 637 OVERRIDE(OP): Performed by: STUDENT IN AN ORGANIZED HEALTH CARE EDUCATION/TRAINING PROGRAM

## 2021-07-26 PROCEDURE — 770001 HCHG ROOM/CARE - MED/SURG/GYN PRIV*

## 2021-07-26 PROCEDURE — 80143 DRUG ASSAY ACETAMINOPHEN: CPT

## 2021-07-26 PROCEDURE — 700111 HCHG RX REV CODE 636 W/ 250 OVERRIDE (IP): Performed by: INTERNAL MEDICINE

## 2021-07-26 PROCEDURE — A9270 NON-COVERED ITEM OR SERVICE: HCPCS | Performed by: STUDENT IN AN ORGANIZED HEALTH CARE EDUCATION/TRAINING PROGRAM

## 2021-07-26 RX ORDER — HYDROXYZINE HYDROCHLORIDE 25 MG/1
50 TABLET, FILM COATED ORAL EVERY 8 HOURS PRN
Status: DISCONTINUED | OUTPATIENT
Start: 2021-07-26 | End: 2021-07-27 | Stop reason: HOSPADM

## 2021-07-26 RX ADMIN — HYDROXYZINE HYDROCHLORIDE 50 MG: 25 TABLET, FILM COATED ORAL at 21:04

## 2021-07-26 RX ADMIN — NICOTINE POLACRILEX 2 MG: 2 GUM, CHEWING BUCCAL at 20:07

## 2021-07-26 RX ADMIN — FAMOTIDINE 20 MG: 20 TABLET ORAL at 04:19

## 2021-07-26 RX ADMIN — NICOTINE 14 MG: 14 PATCH TRANSDERMAL at 04:19

## 2021-07-26 RX ADMIN — SERTRALINE HYDROCHLORIDE 50 MG: 50 TABLET ORAL at 13:00

## 2021-07-26 RX ADMIN — Medication 5 MG: at 21:04

## 2021-07-26 RX ADMIN — ENOXAPARIN SODIUM 40 MG: 40 INJECTION SUBCUTANEOUS at 04:19

## 2021-07-26 RX ADMIN — HYDROXYZINE HYDROCHLORIDE 50 MG: 25 TABLET, FILM COATED ORAL at 13:00

## 2021-07-26 ASSESSMENT — LIFESTYLE VARIABLES
PAROXYSMAL SWEATS: NO SWEAT VISIBLE
TREMOR: NO TREMOR
NAUSEA AND VOMITING: NO NAUSEA AND NO VOMITING
TOTAL SCORE: 2
ANXIETY: MILDLY ANXIOUS
HEADACHE, FULLNESS IN HEAD: NOT PRESENT
VISUAL DISTURBANCES: NOT PRESENT
AGITATION: NORMAL ACTIVITY
TOTAL SCORE: VERY MILD ITCHING, PINS AND NEEDLES SENSATION, BURNING OR NUMBNESS
ORIENTATION AND CLOUDING OF SENSORIUM: ORIENTED AND CAN DO SERIAL ADDITIONS
AUDITORY DISTURBANCES: NOT PRESENT

## 2021-07-26 ASSESSMENT — ENCOUNTER SYMPTOMS
MYALGIAS: 0
HEADACHES: 0
DIAPHORESIS: 0
TREMORS: 0
FEVER: 0
ORTHOPNEA: 0
NERVOUS/ANXIOUS: 1
ABDOMINAL PAIN: 0
DIARRHEA: 0
NECK PAIN: 0
SEIZURES: 0
HALLUCINATIONS: 0

## 2021-07-26 NOTE — PROGRESS NOTES
Media Information  File Link    Monitoring Strips GE - Scan on 7/26/2021 12:00 AM      Key Information    Document ID File Type Document Type Description   [id number] Image Monitoring Strips GE    Import Information    Attached At Date Time User Dept   Encounter Level 7/26/2021  1:22 AM Physician Outpatient    Encounter

## 2021-07-26 NOTE — CARE PLAN
Problem: Provide Safe Environment  Goal: Suicide environmental safety, protocols, policies, and practices will be implemented  Outcome: Progressing     Problem: Optimal Care for Alcohol Withdrawal  Goal: Optimal Care for the alcohol withdrawal patient  Outcome: Progressing     The patient is Stable - Low risk of patient condition declining or worsening    Shift Goals  Clinical Goals: remain free of seizures, injuries  Patient Goals: to go home, to walk in halls  Family Goals: n/a    Progress made toward(s) clinical / shift goals:  keep safe, monitor anxiety levels    Patient is not progressing towards the following goals:    Received report at 1900 from Kenyatta HATFIELD. Patient admitted on 7/18/21 for S.I attempt. Patient A&O x4. Activity: standby assist Skin: left wrist wound Pain: 0/10. Breathing even and non labored on room air. Sitter at bedside, CIWA scores taken Q4.Tele: SR/ST. Will continue to monitor

## 2021-07-26 NOTE — CARE PLAN
Problem: Knowledge Deficit - Standard  Goal: Patient and family/care givers will demonstrate understanding of plan of care, disease process/condition, diagnostic tests and medications  Outcome: Progressing     Problem: Provide Safe Environment  Goal: Suicide environmental safety, protocols, policies, and practices will be implemented  Outcome: Progressing     Problem: Psychosocial  Goal: Patient's ability to identify and develop effective coping behaviors will improve  Outcome: Progressing  Goal: Patient's ability to identify and utilize available support systems will improve  Outcome: Progressing     Problem: Optimal Care for Alcohol Withdrawal  Goal: Optimal Care for the alcohol withdrawal patient  Outcome: Progressing     Problem: Seizure Precautions  Goal: Implementation of seizure precautions  Outcome: Progressing     Problem: Lifestyle Changes  Goal: Patient's ability to identify lifestyle changes and available resources to help reduce recurrence of condition will improve  Outcome: Progressing     Problem: Psychosocial  Goal: Patient's level of anxiety will decrease  Outcome: Progressing  Goal: Spiritual and cultural needs incorporated into hospitalization  Outcome: Progressing     Problem: Risk for Aspiration  Goal: Patient's risk for aspiration will be absent or decrease  Outcome: Progressing     Problem: Pain - Standard  Goal: Alleviation of pain or a reduction in pain to the patient’s comfort goal  Outcome: Progressing   The patient is Stable - Low risk of patient condition declining or worsening    Shift Goals  Clinical Goals: decreased anxiety, discharge planning  Patient Goals: decreased anxiety, discharge planning  Family Goals: n/a    Progress made toward(s) clinical / shift goals:  yes      Patient is not progressing towards the following goals:

## 2021-07-26 NOTE — DISCHARGE PLANNING
Agency/Facility Name: Behavioral Health facilities  Referral sent per [employer] form @ [phone]  Agency/Facility Name: [facility]   Spoke To: Skyler  Outcome: Referral received in pending

## 2021-07-26 NOTE — PROGRESS NOTES
Report received and assumed care of patient. Patient sitting up in bed c/o anxiety and wanting meds. Patient appears calm and no visible signs of anxiety at this time. Will continue to monitor. 1:1 sitter at bedside and safety measures in place.
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
## 2021-07-26 NOTE — PROGRESS NOTES
Hospital Medicine Daily Progress Note    Date of Service  7/26/2021    Chief Complaint  Lexie Rush is a 26 y.o. female admitted 7/18/2021 with Tylenol overdose    Hospital Course  26-year-old female with a past medical history of polysubstance use disorder including methamphetamines, heroin, alcohol admitted on 7/18/2021 with Tylenol overdose, took 40+ tablets, level 313 then 328 on admission.  Patient was positive on UDS for methamphetamines, opioids/heroin, alcohol level elevated 183.8.  On admission patient received N-acetylcysteine, detox bag and placed on CIWA protocol.  Poison control was called.  Pregnancy test was negative. Patient did cut her wrist 3 days prior to admission, as per HPI.  Involuntary hold paperwork started in ED.    7/20 - patient legal hold extended. Psychiatry team requesting patient dc to inpatient psychiatric hospital when medically cleared.  7/21 - completed NAC treatments, acetaminophen levels remain undetectable, LFT normalized.  7/22 - patient had total 3 seizures (1 early morning, 2 afternoon).   Neurology evaluated patient, determined seizures are from illicit drug and ETOH withdrawal, recommended EEG and Ativan, no AED yet.  7/25 - medically cleared, no further seizures    Interval Problem Update  Patient seen in her room with sitter. Patient stated she was doing better today.  Patient has not had a seizure 3 days now,out of withdrawal.   Signed paperwork for medical clearance on legal hold paperwork.  Inpatient psychiatric facility pending.  I have personally seen and examined the patient at bedside. I discussed the plan of care with patient, bedside RN, charge RN,  and pharmacy.    Consultants/Specialty   psychiatry and Poison Control    Code Status  Full Code    Disposition  Patient is medically cleared pending inpatient psychiatry facility.   Anticipate discharge to to a psychiatric hospital.  I have placed the appropriate orders for post-discharge  needs.    Review of Systems  Review of Systems   Constitutional: Negative for diaphoresis and fever.   Cardiovascular: Negative for orthopnea and leg swelling.   Gastrointestinal: Negative for abdominal pain and diarrhea.   Musculoskeletal: Negative for myalgias and neck pain.   Neurological: Negative for tremors, seizures and headaches.   Psychiatric/Behavioral: Negative for hallucinations and suicidal ideas. The patient is nervous/anxious.    All other systems reviewed and are negative.     Physical Exam  Temp:  [36 °C (96.8 °F)-36.7 °C (98 °F)] 36.7 °C (98 °F)  Pulse:  [] 110  Resp:  [16-18] 16  BP: (103-111)/(53-69) 108/61  SpO2:  [96 %-99 %] 96 %    Physical Exam  Vitals and nursing note reviewed. Exam conducted with a chaperone present.   Constitutional:       General: She is not in acute distress.     Appearance: Normal appearance. She is not ill-appearing.   HENT:      Head: Normocephalic and atraumatic.   Musculoskeletal:         General: No swelling or tenderness. Normal range of motion.      Cervical back: Normal range of motion and neck supple.   Skin:     General: Skin is warm.      Coloration: Skin is not jaundiced.      Findings: No erythema.   Neurological:      General: No focal deficit present.      Mental Status: She is alert and oriented to person, place, and time. Mental status is at baseline.      Motor: No weakness or tremor.   Psychiatric:         Mood and Affect: Mood normal.         Behavior: Behavior normal.      Comments: Patient denies any suicidal or homicidal ideation       Fluids    Intake/Output Summary (Last 24 hours) at 7/26/2021 1500  Last data filed at 7/26/2021 0528  Gross per 24 hour   Intake 120 ml   Output --   Net 120 ml       Laboratory                        Imaging  MR-BRAIN-WITH & W/O   Final Result      Unremarkable MRI of the brain with and without contrast enhancement.      No imaging ordered on this admission at this time.    Assessment/Plan  * Tylenol  overdose, intentional self-harm, initial encounter (HCC)- (present on admission)  Assessment & Plan  Reportedly took 40 tablets of Tylenol at 3 PM.  Tylenol level 313 --> 64  completed NAC per protocol  Legal hold on admission, extended by psychiatry team.  Improved, LFTs improved    Tylenol overdose treated and resolved.    Intentional self-harm still active  - Suicidal precaution, fall, aspiration, seizure precautions  - As per psychiatric services, patient will need inpatient psychiatric facility admission once she is medically cleared.    - At this time patient is not medically cleared, currently undergoing withdrawal from her multiple illicit drugs.  - Legal hold extended and court petition filed via [email or web address] 7/21    Involuntary commitment- (present on admission)  Assessment & Plan  Patient on legal hold for intentional self harm with tylenol overdose  Legal hold extended and sent via [email or web address] to court  Psychiatry following.  Spoke with Dr. Iqbal, patient will continue on route to inpatient psychiatric facility on discharge when medically cleared.    Alcohol withdrawal seizure with complication (HCC)  Assessment & Plan  Resolved.  No seizure for 2 days now, medically cleared.    Patient had seizure episode early morning 7/22. Required IV ativan. Patient back to baseline on morning evaluation.  Difficult to start Librium as patient had tylenol toxicity, high risk for liver failure.  Patient has had total of 4 withdrawal seizures. (7/22 - early morning, 2 in evening. 7/23 - 6am morning)  Reviewed EEG results, does not show epileptic form waves.  Neurology evaluated patient, determined seizures are from illicit drug and ETOH withdrawal, recommended EEG and Ativan, no AED yet.  - continue ativan 4mg IV for seizure PRN  - continue seizure precautions    Alcohol intoxication (HCC)- (present on admission)  Assessment & Plan  Supportive care  Monitor for withdrawal  Continue CIWA protocol, patient continues to require  Ativan.  3 days without seizure, medically cleared now    Tobacco abuse- (present on admission)  Assessment & Plan  Tobacco cessation counseling and education provided for 4 minutes. Nicotine replacement options provided including patch, and further medical treatments including Wellbutrin and Chantix. As well as over the counter options of lozenges and gum.    Polysubstance abuse (HCC)- (present on admission)  Assessment & Plan  UDS positive for methamphetamine, opioids  We will plan to provide with illicit drug counseling when alcohol intoxication resolves.   7/20-patient counseled with nurse present in the room about illicit drug use cessation    Hypokalemia- (present on admission)  Assessment & Plan  Monitor and replace     VTE prophylaxis: enoxaparin ppx    I have performed a physical exam and reviewed and updated ROS and Plan today (7/26/2021). In review of yesterday's note (7/25/2021), there are no changes except as documented above.

## 2021-07-27 VITALS
OXYGEN SATURATION: 98 % | TEMPERATURE: 98.8 F | RESPIRATION RATE: 18 BRPM | WEIGHT: 139.33 LBS | SYSTOLIC BLOOD PRESSURE: 120 MMHG | BODY MASS INDEX: 21.12 KG/M2 | HEIGHT: 68 IN | DIASTOLIC BLOOD PRESSURE: 82 MMHG | HEART RATE: 98 BPM

## 2021-07-27 PROBLEM — R56.9 ALCOHOL WITHDRAWAL SEIZURE WITH COMPLICATION (HCC): Status: RESOLVED | Noted: 2021-07-22 | Resolved: 2021-07-27

## 2021-07-27 PROBLEM — E87.6 HYPOKALEMIA: Status: RESOLVED | Noted: 2021-07-18 | Resolved: 2021-07-27

## 2021-07-27 PROBLEM — F10.929 ALCOHOL INTOXICATION (HCC): Status: RESOLVED | Noted: 2021-07-18 | Resolved: 2021-07-27

## 2021-07-27 PROBLEM — F10.939 ALCOHOL WITHDRAWAL SEIZURE WITH COMPLICATION (HCC): Status: RESOLVED | Noted: 2021-07-22 | Resolved: 2021-07-27

## 2021-07-27 PROCEDURE — A9270 NON-COVERED ITEM OR SERVICE: HCPCS | Performed by: STUDENT IN AN ORGANIZED HEALTH CARE EDUCATION/TRAINING PROGRAM

## 2021-07-27 PROCEDURE — 700111 HCHG RX REV CODE 636 W/ 250 OVERRIDE (IP): Performed by: INTERNAL MEDICINE

## 2021-07-27 PROCEDURE — 99239 HOSP IP/OBS DSCHRG MGMT >30: CPT | Performed by: INTERNAL MEDICINE

## 2021-07-27 PROCEDURE — A9270 NON-COVERED ITEM OR SERVICE: HCPCS | Performed by: INTERNAL MEDICINE

## 2021-07-27 PROCEDURE — 700102 HCHG RX REV CODE 250 W/ 637 OVERRIDE(OP): Performed by: INTERNAL MEDICINE

## 2021-07-27 PROCEDURE — 700102 HCHG RX REV CODE 250 W/ 637 OVERRIDE(OP): Performed by: STUDENT IN AN ORGANIZED HEALTH CARE EDUCATION/TRAINING PROGRAM

## 2021-07-27 RX ORDER — NICOTINE 21 MG/24HR
1 PATCH, TRANSDERMAL 24 HOURS TRANSDERMAL EVERY 24 HOURS
Qty: 30 PATCH | Status: SHIPPED
Start: 2021-07-27

## 2021-07-27 RX ORDER — ZOLPIDEM TARTRATE 5 MG/1
2.5 TABLET ORAL ONCE
Status: COMPLETED | OUTPATIENT
Start: 2021-07-27 | End: 2021-07-27

## 2021-07-27 RX ORDER — HYDROXYZINE 50 MG/1
50 TABLET, FILM COATED ORAL EVERY 8 HOURS PRN
Qty: 30 TABLET | Refills: 0 | Status: SHIPPED
Start: 2021-07-27

## 2021-07-27 RX ADMIN — NICOTINE 14 MG: 14 PATCH TRANSDERMAL at 05:09

## 2021-07-27 RX ADMIN — FAMOTIDINE 20 MG: 20 TABLET ORAL at 05:09

## 2021-07-27 RX ADMIN — ENOXAPARIN SODIUM 40 MG: 40 INJECTION SUBCUTANEOUS at 05:10

## 2021-07-27 RX ADMIN — SERTRALINE HYDROCHLORIDE 50 MG: 50 TABLET ORAL at 05:09

## 2021-07-27 RX ADMIN — HYDROXYZINE HYDROCHLORIDE 50 MG: 25 TABLET, FILM COATED ORAL at 08:50

## 2021-07-27 RX ADMIN — NICOTINE POLACRILEX 2 MG: 2 GUM, CHEWING BUCCAL at 13:44

## 2021-07-27 RX ADMIN — ZOLPIDEM TARTRATE 2.5 MG: 5 TABLET ORAL at 00:12

## 2021-07-27 NOTE — DISCHARGE PLANNING
Agency/Facility Name: [facility]   Spoke To: Rika  Outcome: No beds available    Agency/Facility Name: [facility]  Outcome: Left Vmail regarding referral     Agency[facility]Facility Name: [facility]  Spoke To: Lyssa  Outcome: refaxing as Lyssa did not receive referral

## 2021-07-27 NOTE — CARE PLAN
Problem: Knowledge Deficit - Standard  Goal: Patient and family/care givers will demonstrate understanding of plan of care, disease process/condition, diagnostic tests and medications  Outcome: Progressing     Problem: Provide Safe Environment  Goal: Suicide environmental safety, protocols, policies, and practices will be implemented  Outcome: Progressing     The patient is Stable - Low risk of patient condition declining or worsening    Shift Goals  Clinical Goals: keep safe  Patient Goals: decreased anxiety, discharge planning  Family Goals: n/a    Progress made toward(s) clinical / shift goals:  keep safe     Patient is not progressing towards the following goals:    Received report at 1900 from Rizwana [facility]. Patient admitted on 7/18/21 for S.I attempt. Patient A&O x4. Activity: standby assist Skin: left wrist wound Pain: 0/10. Breathing even and non labored on room air. Sitter at bedside, Tele: none. Will continue to monitor

## 2021-07-27 NOTE — DISCHARGE PLANNING
Legal Hold     Referral: Legal Hold Court     Intervention: Pt presented for legal hold meeting with  via video conferencing to discuss legal options of contesting hold and meeting with the court doctors tomorrow afternoon, or not contesting legal hold and continuing the hold for one week.  advised that pt's legal hold will be be continued for one week (8/5).       Plan: Pt's legal hold has been continued for one week. Pt awaiting transfer to an in patient psych facility.

## 2021-07-27 NOTE — DISCHARGE INSTRUCTIONS
Discharge Instructions    Discharged to other by medical transportation with self. Discharged via wheelchair, hospital escort: Yes.  Special equipment needed: Not Applicable    Be sure to schedule a follow-up appointment with your primary care doctor or any specialists as instructed.     Discharge Plan:   Diet Plan: Discussed  Activity Level: Discussed  Confirmed Follow up Appointment: No (Comments)  Confirmed Symptoms Management: Discussed  Medication Reconciliation Updated: Yes    I understand that a diet low in cholesterol, fat, and sodium is recommended for good health. Unless I have been given specific instructions below for another diet, I accept this instruction as my diet prescription.   Other diet: Regular    Special Instructions: None    · Is patient discharged on Warfarin / Coumadin?   No     Depression / Suicide Risk    As you are discharged from this [facility] facility, it is important to learn how to keep safe from harming yourself.    Recognize the warning signs:  · Abrupt changes in personality, positive or negative- including increase in energy   · Giving away possessions  · Change in eating patterns- significant weight changes-  positive or negative  · Change in sleeping patterns- unable to sleep or sleeping all the time   · Unwillingness or inability to communicate  · Depression  · Unusual sadness, discouragement and loneliness  · Talk of wanting to die  · Neglect of personal appearance   · Rebelliousness- reckless behavior  · Withdrawal from people/activities they love  · Confusion- inability to concentrate     If you or a loved one observes any of these behaviors or has concerns about self-harm, here's what you can do:  · Talk about it- your feelings and reasons for harming yourself  · Remove any means that you might use to hurt yourself (examples: pills, rope, extension cords, firearm)  · Get professional help from the community (Mental Health, Substance Abuse, psychological  counseling)  · Do not be alone:Call your Safe Contact- someone whom you trust who will be there for you.  · Call your local CRISIS HOTLINE 459-2724 or 007-862-5735  · Call your local Children's Mobile Crisis Response Team [place] (159) 880-2180 or www.Tapvalue  · Call the toll free National Suicide Prevention Hotlines   · National Suicide Prevention Lifeline 744-687-OSKJ (8720)  · National Hope Line Network 800-SUICIDE (095-8769)

## 2021-07-27 NOTE — PROGRESS NOTES
Patient discharged off unit via EMS. AVS reviewed with patient including medications. Questions answered and concerns addressed. Family updated on patient's transfer for [facility]. All belongings sent with patient. Report called to nurse at facility.

## 2021-07-27 NOTE — DISCHARGE PLANNING
Anticipated Discharge Disposition:   Inpatient Psych    Action:   Discussed discharge planning needs during rounds. Per MD, pt is medically cleared for discharge to inpatient psych pending acceptance.    Received call from Wilbert from [facility] ([phone]). Per Wilbert, they are accepting the pt, can take her today, Dr. Her accepting. MD and bedside RN updated.    Transportation communication form and [facility] PCA faxed to [employer]. Voalte message sent.  COBRA and transfer packet given to bedside RN.     Barriers to Discharge:   Transportation confirmation per [employer]    Plan:   Hospital Care Management will continue to follow and assist with discharge planning needs.

## 2021-07-27 NOTE — DISCHARGE SUMMARY
"Discharge Summary    CHIEF COMPLAINT ON ADMISSION  Chief Complaint   Patient presents with   • Drug Overdose     per EMS, patient took 40 pills of tylenol. States, daughter with father at the moment. states \"i'm a bad mom\"       Reason for Admission  possible OD     CODE STATUS  Full Code    HPI & HOSPITAL COURSE  This is a 26 y.o. female here with Tylenol overdose.    26-year-old female with a past medical history of polysubstance use disorder including methamphetamines, heroin, alcohol admitted on 7/18/2021 with Tylenol overdose, took 40+ tablets, level 313 then 328 on admission.  Patient was positive on UDS for methamphetamines, opioids/heroin, alcohol level elevated 183.8.  On admission patient received N-acetylcysteine, detox bag and placed on CIWA protocol.  Poison control was called and Tylenol level negative and LFTs within normal limits. Pregnancy test was negative. Patient did cut her wrist 3 days prior to admission, as per HPI.    Patient placed on a legal hold and psychiatry was consulted, restarted Zoloft which patient had been on in the past.  During admission patient noted to have 3 seizures and neurology was consulted.  He determined that seizures are from illicit drug and alcohol withdrawal, EEG negative, recommended Ativan as needed no need for AED.  Patient was medically cleared and psychiatry recommended transfer to inpatient psychiatric hospital.  Patient's vital signs are stable and she is ready for transfer.    Therefore, she is discharged in good and stable condition to a psychiatric hospital.    The patient met 2-midnight criteria for an inpatient stay at the time of discharge.      FOLLOW UP ITEMS POST DISCHARGE  Follow-up with outpatient psychiatry  Follow-up with primary care physician post hospitalization.    DISCHARGE DIAGNOSES  Principal Problem:    Tylenol overdose, intentional self-harm, initial encounter (HCC) POA: Yes  Active Problems:    Polysubstance abuse (HCC) POA: Yes    " Tobacco abuse POA: Yes    Involuntary commitment POA: Yes  Resolved Problems:    Alcohol intoxication (HCC) POA: Yes    Hypokalemia POA: Yes    Alcohol withdrawal seizure with complication (HCC) POA: No      FOLLOW UP  No future appointments.  No follow-up provider specified.    MEDICATIONS ON DISCHARGE     Medication List      START taking these medications      Instructions   hydrOXYzine HCl 50 MG Tabs  Commonly known as: ATARAX   Take 1 tablet by mouth every 8 hours as needed for Anxiety.  Dose: 50 mg     nicotine 14 MG/24HR Pt24  Commonly known as: NICODERM   Place 1 Patch on the skin every 24 hours.  Dose: 1 Patch     nicotine polacrilex 2 MG Gum  Commonly known as: NICORETTE   Take 1 Each by mouth every 1 hour as needed for Smoking Cessation (For nicotine urge).  Dose: 2 mg     sertraline 50 MG Tabs  Start taking on: July 28, 2021  Commonly known as: Zoloft   Take 1 tablet by mouth every day.  Dose: 50 mg            Allergies  Allergies   Allergen Reactions   • Amoxicillin Unspecified     Causes Henoch Purpura. Unknown allergic reaction   • Ibuprofen Unspecified     Causes Henoch Purpura. Takes Advil without problems.       DIET  Orders Placed This Encounter   Procedures   • Diet Order Diet: Regular; Tray Modifications (optional): No Sharps (Paperware)     Paperware only on meal tray.     Standing Status:   Standing     Number of Occurrences:   1     Order Specific Question:   Diet:     Answer:   Regular [1]     Order Specific Question:   Tray Modifications (optional)     Answer:   No Sharps (Paperware)       ACTIVITY  As tolerated.  Weight bearing as tolerated    LINES, DRAINS, AND WOUNDS  This is an automated list. Peripheral IVs will be removed prior to discharge.       Wound 07/19/21 Wrist Left (Active)   Wound Image    07/19/21 1700   Site Assessment JOHN 07/27/21 0728   Periwound Assessment Normal Temperature 07/26/21 0800   Margins Defined edges 07/26/21 0800   Closure Closure strips 07/27/21 0728    Drainage Amount None 07/26/21 0800   Treatments Cleansed 07/25/21 1436   Wound Cleansing Normal Saline Irrigation 07/25/21 1436   Periwound Protectant Not Applicable 07/22/21 1239   Dressing Cleansing/Solutions Normal Saline 07/25/21 1436   Dressing Options Other (Comments) 07/25/21 1436   Dressing Changed Observed 07/26/21 0800   Dressing Status Clean;Dry;Intact 07/26/21 1953   Dressing Change/Treatment Frequency Every 72 hrs, and As Needed 07/25/21 1436   NEXT Dressing Change/Treatment Date 07/28/21 07/26/21 0800   NEXT Weekly Photo (Inpatient Only) 07/26/21 07/19/21 1700   Non-staged Wound Description Full thickness 07/19/21 1700   Wound Length (cm) 1 cm 07/19/21 1700   Wound Width (cm) 5.5 cm 07/19/21 1700   Wound Depth (cm) 0.8 cm 07/19/21 1700   Wound Surface Area (cm^2) 5.5 cm^2 07/19/21 1700   Wound Volume (cm^3) 4.4 cm^3 07/19/21 1700   Shape linear 07/19/21 1700   Wound Odor None 07/20/21 1947   Pulses N/A 07/19/21 1700   Exposed Structures None 07/19/21 1700   WOUND NURSE ONLY - Time Spent with Patient (mins) 45 07/19/21 1700                  MENTAL STATUS ON TRANSFER             CONSULTATIONS  Psychiatry  Neurology    PROCEDURES  EEG    LABORATORY  Lab Results   Component Value Date    SODIUM 136 07/23/2021    POTASSIUM 3.8 07/23/2021    CHLORIDE 101 07/23/2021    CO2 22 07/23/2021    GLUCOSE 101 (H) 07/23/2021    BUN 9 07/23/2021    CREATININE 0.52 07/23/2021        Lab Results   Component Value Date    WBC 7.5 07/20/2021    HEMOGLOBIN 13.2 07/20/2021    HEMATOCRIT 39.1 07/20/2021    PLATELETCT 253 07/20/2021        Total time of the discharge process exceeds 33 minutes.

## 2021-07-27 NOTE — DISCHARGE PLANNING
Received Transport Form @ 6501  Spoke to Roxi @ [facility]. [id number].    Spoke to Mary STAHL.   Transport is scheduled for 7/27 @5877 going to [facility].    BS RN and RN CM notified of scheduled transport via Voalte @1950.

## 2022-07-03 ENCOUNTER — OFFICE VISIT (OUTPATIENT)
Dept: URGENT CARE | Facility: CLINIC | Age: 27
End: 2022-07-03
Payer: MEDICAID

## 2022-07-03 ENCOUNTER — HOSPITAL ENCOUNTER (OUTPATIENT)
Facility: MEDICAL CENTER | Age: 27
End: 2022-07-03
Attending: STUDENT IN AN ORGANIZED HEALTH CARE EDUCATION/TRAINING PROGRAM
Payer: MEDICAID

## 2022-07-03 VITALS
BODY MASS INDEX: 21.82 KG/M2 | OXYGEN SATURATION: 97 % | WEIGHT: 139 LBS | HEIGHT: 67 IN | DIASTOLIC BLOOD PRESSURE: 84 MMHG | TEMPERATURE: 98.7 F | RESPIRATION RATE: 18 BRPM | SYSTOLIC BLOOD PRESSURE: 120 MMHG | HEART RATE: 113 BPM

## 2022-07-03 DIAGNOSIS — N89.8 VAGINAL DISCHARGE: ICD-10-CM

## 2022-07-03 DIAGNOSIS — L73.9 FOLLICULITIS: ICD-10-CM

## 2022-07-03 DIAGNOSIS — N30.00 ACUTE CYSTITIS WITHOUT HEMATURIA: ICD-10-CM

## 2022-07-03 LAB
APPEARANCE UR: NORMAL
BILIRUB UR STRIP-MCNC: NORMAL MG/DL
COLOR UR AUTO: NORMAL
GLUCOSE UR STRIP.AUTO-MCNC: NEGATIVE MG/DL
INT CON NEG: NORMAL
INT CON POS: NORMAL
KETONES UR STRIP.AUTO-MCNC: NEGATIVE MG/DL
LEUKOCYTE ESTERASE UR QL STRIP.AUTO: NORMAL
NITRITE UR QL STRIP.AUTO: POSITIVE
PH UR STRIP.AUTO: 5.5 [PH] (ref 5–8)
POC URINE PREGNANCY TEST: NEGATIVE
PROT UR QL STRIP: 30 MG/DL
RBC UR QL AUTO: NORMAL
SP GR UR STRIP.AUTO: >=1.03
UROBILINOGEN UR STRIP-MCNC: 0.2 MG/DL

## 2022-07-03 PROCEDURE — 99214 OFFICE O/P EST MOD 30 MIN: CPT | Performed by: STUDENT IN AN ORGANIZED HEALTH CARE EDUCATION/TRAINING PROGRAM

## 2022-07-03 PROCEDURE — 87510 GARDNER VAG DNA DIR PROBE: CPT

## 2022-07-03 PROCEDURE — 81002 URINALYSIS NONAUTO W/O SCOPE: CPT | Performed by: STUDENT IN AN ORGANIZED HEALTH CARE EDUCATION/TRAINING PROGRAM

## 2022-07-03 PROCEDURE — 87660 TRICHOMONAS VAGIN DIR PROBE: CPT

## 2022-07-03 PROCEDURE — 87491 CHLMYD TRACH DNA AMP PROBE: CPT

## 2022-07-03 PROCEDURE — 87591 N.GONORRHOEAE DNA AMP PROB: CPT

## 2022-07-03 PROCEDURE — 87480 CANDIDA DNA DIR PROBE: CPT

## 2022-07-03 PROCEDURE — 81025 URINE PREGNANCY TEST: CPT | Performed by: STUDENT IN AN ORGANIZED HEALTH CARE EDUCATION/TRAINING PROGRAM

## 2022-07-03 RX ORDER — AZITHROMYCIN 500 MG/1
2000 TABLET, FILM COATED ORAL ONCE
Qty: 4 TABLET | Refills: 0 | Status: SHIPPED | OUTPATIENT
Start: 2022-07-03 | End: 2022-07-03

## 2022-07-03 RX ORDER — NITROFURANTOIN 25; 75 MG/1; MG/1
100 CAPSULE ORAL EVERY 12 HOURS
Qty: 10 CAPSULE | Refills: 0 | Status: SHIPPED | OUTPATIENT
Start: 2022-07-03 | End: 2022-07-08

## 2022-07-03 RX ORDER — CLINDAMYCIN PHOSPHATE 10 MG/G
GEL TOPICAL
Qty: 30 G | Refills: 0 | Status: SHIPPED
Start: 2022-07-03 | End: 2022-07-03

## 2022-07-03 RX ORDER — AZITHROMYCIN 500 MG/1
1000 TABLET, FILM COATED ORAL ONCE
Qty: 2 TABLET | Refills: 0 | Status: SHIPPED
Start: 2022-07-03 | End: 2022-07-03

## 2022-07-03 RX ORDER — DOXYCYCLINE 100 MG/1
100 CAPSULE ORAL 2 TIMES DAILY
Qty: 14 CAPSULE | Refills: 0 | Status: SHIPPED | OUTPATIENT
Start: 2022-07-03 | End: 2022-07-10

## 2022-07-03 ASSESSMENT — FIBROSIS 4 INDEX: FIB4 SCORE: 0.52

## 2022-07-04 NOTE — PROGRESS NOTES
"Smith Rush is a 27 y.o. female who presents with Abscess (Both armpits, x3days/) and Vaginal Discharge (And odor, possible exposure to STD)            Patient presents today with bilateral underarm rash and vaginal discharge. Patient states rash is red and inflamed but is not itchy. She states has noticed no swelling associated with rash. There is no active drainage, vesicles or blisters. She has states she has been having increased vaginal discharge with odor of \"rotten eggs.\" She may have been exposed to STDs recently and would like to be treated/tested in clinic today. She also has had symptoms of painful urination, increased frequency/urgency. She denies flank pain, abdominal pain or blood in her urine.      Review of Systems   Constitutional: Negative for chills and fever.   HENT: Negative.    Eyes: Negative.    Respiratory: Negative.    Cardiovascular: Negative.    Gastrointestinal: Negative for abdominal pain, diarrhea, nausea and vomiting.   Genitourinary: Positive for dysuria, frequency and urgency. Negative for flank pain and hematuria.        Positive vaginal discharge.   Musculoskeletal: Negative.    Skin: Positive for rash.   Neurological: Negative.               Objective     /84   Pulse (!) 113   Temp 37.1 °C (98.7 °F) (Temporal)   Resp 18   Ht 1.702 m (5' 7\")   Wt 63 kg (139 lb)   SpO2 97%   BMI 21.77 kg/m²      Physical Exam  Vitals reviewed.   Constitutional:       Appearance: Normal appearance.   HENT:      Mouth/Throat:      Mouth: Mucous membranes are moist.      Pharynx: Oropharynx is clear.   Eyes:      Conjunctiva/sclera: Conjunctivae normal.      Pupils: Pupils are equal, round, and reactive to light.   Cardiovascular:      Rate and Rhythm: Regular rhythm. Tachycardia present.      Heart sounds: Normal heart sounds.   Pulmonary:      Effort: Pulmonary effort is normal.      Breath sounds: Normal breath sounds.   Abdominal:      General: Abdomen is flat. Bowel " sounds are normal. There is no distension.      Palpations: Abdomen is soft.      Tenderness: There is no abdominal tenderness. There is no right CVA tenderness, left CVA tenderness or guarding.   Skin:     General: Skin is warm and dry.      Capillary Refill: Capillary refill takes less than 2 seconds.   Neurological:      General: No focal deficit present.      Mental Status: She is alert and oriented to person, place, and time.                             Assessment & Plan        1. Acute cystitis without hematuria  - nitrofurantoin (MACROBID) 100 MG Cap; Take 1 Capsule by mouth every 12 hours for 5 days.  Dispense: 10 Capsule; Refill: 0  - POCT Urinalysis  - POCT Pregnancy  - Chlamydia/GC, PCR (Genital/Anal swab); Future  - VAGINAL PATHOGENS DNA PANEL    The patient's presenting symptoms, physical exam findings and POCT urinalysis are consistent with an acute urinary tract infection. Patient treated with macrobid for 5 days.     OTC Tylenol or Motrin for fever/discomfort.  Drink plenty of fluids  Follow-up with PCP    Return to clinic or go to the ED if symptoms worsen or fail to improve, or if the patient should develop worsening/increasing urinary symptoms, hematuria, flank pain, abdominal pain, nausea/vomiting, fever/chills, and/or any concerning symptoms.    2. Vaginal discharge  - Chlamydia/GC, PCR (Genital/Anal swab); Future  - VAGINAL PATHOGENS DNA PANEL  - azithromycin (ZITHROMAX) 500 MG tablet; Take 4 Tablets by mouth one time for 1 dose.  Dispense: 4 Tablet; Refill: 0  - doxycycline (MONODOX) 100 MG capsule; Take 1 Capsule by mouth 2 times a day for 7 days.  Dispense: 14 Capsule; Refill: 0    Patient would like to be treated for Chlamydia/GC before results. Treatment with azithromycin and doxycycline due to amoxicillin severe allergy. Patient will be notified of results via Power[employer]. Vaginal Pathogens also sent off to lab and results will be reviewed when available and appropriate treatment will be  started if needed.    2. Folliculitis  Patient has folliculitis of underarms. Patient educated on supportive measures and reassured that mild folliculitis with few pustules often resolves spontaneously. Patient recommended daily washing of the affected area with an antimicrobial cleanser, such as benzoyl peroxide.     I personally reviewed prior external notes and test results pertinent to today's visit.    Differential diagnoses, supportive care, and indications for immediate follow-up discussed with patient. Pathogenesis of diagnosis discussed including typical length and natural progression.    Instructed to return to urgent care or nearest emergency department if symptoms fail to improve, for any change in condition, further concerns, or new concerning symptoms.    Patient states understanding and agree with the plan of care and discharge instructions.

## 2022-07-05 LAB
C TRACH DNA GENITAL QL NAA+PROBE: NEGATIVE
N GONORRHOEA DNA GENITAL QL NAA+PROBE: NEGATIVE
SPECIMEN SOURCE: NORMAL

## 2022-07-09 ASSESSMENT — ENCOUNTER SYMPTOMS
NAUSEA: 0
CHILLS: 0
RESPIRATORY NEGATIVE: 1
DIARRHEA: 0
VOMITING: 0
EYES NEGATIVE: 1
ABDOMINAL PAIN: 0
CARDIOVASCULAR NEGATIVE: 1
FEVER: 0
MUSCULOSKELETAL NEGATIVE: 1
NEUROLOGICAL NEGATIVE: 1
FLANK PAIN: 0

## 2022-10-12 ENCOUNTER — HOSPITAL ENCOUNTER (OUTPATIENT)
Facility: MEDICAL CENTER | Age: 27
End: 2022-10-12
Attending: OBSTETRICS & GYNECOLOGY | Admitting: OBSTETRICS & GYNECOLOGY
Payer: MEDICAID

## 2022-10-12 ENCOUNTER — APPOINTMENT (OUTPATIENT)
Dept: OBGYN | Facility: CLINIC | Age: 27
End: 2022-10-12
Payer: MEDICAID

## 2022-10-12 ENCOUNTER — GYNECOLOGY VISIT (OUTPATIENT)
Dept: OBGYN | Facility: CLINIC | Age: 27
End: 2022-10-12
Payer: MEDICAID

## 2022-10-12 VITALS
SYSTOLIC BLOOD PRESSURE: 92 MMHG | DIASTOLIC BLOOD PRESSURE: 58 MMHG | BODY MASS INDEX: 25.87 KG/M2 | HEIGHT: 67 IN | WEIGHT: 164.8 LBS

## 2022-10-12 DIAGNOSIS — N93.8 DUB (DYSFUNCTIONAL UTERINE BLEEDING): ICD-10-CM

## 2022-10-12 DIAGNOSIS — K08.9 POOR DENTITION: ICD-10-CM

## 2022-10-12 DIAGNOSIS — F32.A ANXIETY AND DEPRESSION: ICD-10-CM

## 2022-10-12 DIAGNOSIS — F41.9 ANXIETY AND DEPRESSION: ICD-10-CM

## 2022-10-12 PROCEDURE — 99203 OFFICE O/P NEW LOW 30 MIN: CPT | Mod: 25 | Performed by: PHYSICIAN ASSISTANT

## 2022-10-12 PROCEDURE — 76801 OB US < 14 WKS SINGLE FETUS: CPT | Performed by: PHYSICIAN ASSISTANT

## 2022-10-12 RX ORDER — PNV NO.95/FERROUS FUM/FOLIC AC 28MG-0.8MG
1 TABLET ORAL DAILY
Qty: 90 TABLET | Refills: 5 | Status: SHIPPED | OUTPATIENT
Start: 2022-10-12

## 2022-10-12 ASSESSMENT — EDINBURGH POSTNATAL DEPRESSION SCALE (EPDS)
I HAVE BEEN SO UNHAPPY THAT I HAVE HAD DIFFICULTY SLEEPING: YES, SOMETIMES
I HAVE LOOKED FORWARD WITH ENJOYMENT TO THINGS: DEFINITELY LESS THAN I USED TO
I HAVE BEEN ANXIOUS OR WORRIED FOR NO GOOD REASON: YES, SOMETIMES
TOTAL SCORE: 19
I HAVE FELT SCARED OR PANICKY FOR NO GOOD REASON: YES, SOMETIMES
I HAVE FELT SAD OR MISERABLE: YES, MOST OF THE TIME
I HAVE BEEN SO UNHAPPY THAT I HAVE BEEN CRYING: ONLY OCCASIONALLY
I HAVE BEEN ABLE TO LAUGH AND SEE THE FUNNY SIDE OF THINGS: NOT QUITE SO MUCH NOW
I HAVE BLAMED MYSELF UNNECESSARILY WHEN THINGS WENT WRONG: YES, SOME OF THE TIME
THE THOUGHT OF HARMING MYSELF HAS OCCURRED TO ME: SOMETIMES
THINGS HAVE BEEN GETTING ON TOP OF ME: YES, SOMETIMES I HAVEN'T BEEN COPING AS WELL AS USUAL

## 2022-10-12 ASSESSMENT — FIBROSIS 4 INDEX: FIB4 SCORE: 0.52

## 2022-10-12 NOTE — PROGRESS NOTES
"Smith Rush is a 27 y.o. female who presents with Gynecologic Exam (DUB)  Pt is unsure of LMP - irregular menses. Dating is by US today. FOB present and supportive today.   OBHx: First pregnancy was induction at 23wk for mono-mono twin with severe hydrocephalus, one baby passed prior to delivery, other passed after due to prematurity and medical condition. Pt then had term  without complications, denies GDM, PIH or delivery complications - baby lives with the father, pt does not have custody.   PMHx: Depression, anxiety - denies SI, HI but would like support - pt to try rehab centers, therapy and will start Zoloft today.   SHX: Denies  Allergies: NKDA(per pt) - amoxicillin and ibuprofen on list but sounds like conflicting information.   Social: Denies etoh use. Pt does vape with tobacco daily - unsure how many cigarettes that equates to, but pt is trying to cut back and quit. Pt also uses daily Fentanyl, not prescribed by doctor, wanting to get help - resources given to pt today.  Meds; Taking PNV gummies - encouraged to switch to PNV tabs, rx given today.   Pt currently denies cramping, bleeding or pain. No FM.            HPI    Review of Systems   All other systems reviewed and are negative.           Objective     BP (!) 92/58   Ht 5' 7\"   Wt 164 lb 12.8 oz   LMP 2022   BMI 25.81 kg/m²      Physical Exam  Vitals reviewed.   Constitutional:       Appearance: She is well-developed.   HENT:      Head: Normocephalic and atraumatic.   Eyes:      Pupils: Pupils are equal, round, and reactive to light.   Neck:      Thyroid: No thyromegaly.   Cardiovascular:      Rate and Rhythm: Normal rate and regular rhythm.      Heart sounds: Normal heart sounds.   Pulmonary:      Effort: Pulmonary effort is normal. No respiratory distress.      Breath sounds: Examination of the right-middle field reveals wheezing and rhonchi. Examination of the left-middle field reveals wheezing and rhonchi. Wheezing " and rhonchi present.   Abdominal:      General: Bowel sounds are normal. There is no distension.      Palpations: Abdomen is soft.      Tenderness: There is no abdominal tenderness.   Genitourinary:     Exam position: Supine.      Uterus: Enlarged (Gravid, uterus c/w 10 wk size). Not deviated and not tender.    Musculoskeletal:      Cervical back: Normal range of motion and neck supple.   Skin:     General: Skin is warm and dry.      Findings: No erythema.   Neurological:      Mental Status: She is alert.      Deep Tendon Reflexes: Reflexes are normal and symmetric.   Psychiatric:         Behavior: Behavior normal.         Thought Content: Thought content normal.              BSUS done with single viable IUP seen. CRL c/w 10w1d, CHAO 5/9/23, so will change CHAO as more than 1 wk different from dates based on LMP. +FHT 166bpm. +FM noted.              Assessment & Plan        1. DUB (dysfunctional uterine bleeding)  - NOB visit 2 wk or sooner prn.   - POCT Pregnancy  - PNV rx called in today    2. Poor dentition    3. Depression/anxiety  - Zoloft 50mg daily rx called in today - d/w pt and pt will also start therapy    4. Substance abuse - Fentanyl  - Pt given info for rehab programs, urged to call asap     5. Tobacco use - vaping  - trying to cut back and quit

## 2022-10-12 NOTE — PROGRESS NOTES
"Smith Rush is a 27 y.o. female who presents with Gynecologic Exam (DUB)            HPI    ROS           Objective     BP (!) 92/58   Ht 5' 7\"   Wt 164 lb 12.8 oz   LMP 07/09/2022   BMI 25.81 kg/m²      Physical Exam                        Assessment & Plan        1. DUB (dysfunctional uterine bleeding)  ***  - POCT Pregnancy    2. Poor dentition  ***                  "

## 2022-10-12 NOTE — PROGRESS NOTES
Patient here for GYN/DUB  LMP: 7/9/2022  CHAO: 4/15/2023  GA: 13w4d  Last pap: 4/16/2013 WNL  Pt states no complaints.   UPT unable to void   EPDS 19 notified provider